# Patient Record
Sex: FEMALE | Race: BLACK OR AFRICAN AMERICAN | NOT HISPANIC OR LATINO | Employment: OTHER | URBAN - METROPOLITAN AREA
[De-identification: names, ages, dates, MRNs, and addresses within clinical notes are randomized per-mention and may not be internally consistent; named-entity substitution may affect disease eponyms.]

---

## 2024-10-21 NOTE — H&P
Subjective:     Patient ID: Roopa Marie is a 59 y.o. female.    Chief Complaint: No chief complaint on file.      HPI:  The patient is a 59-year-old female seen in follow-up with bilateral carpal tunnel syndrome.  She was last injected 2024 with good relief and wishes reinjection today.  She wishes to have a left carpal tunnel release.  She does dialysis  and Friday.  She also has a pacemaker.    Past Medical History:   Diagnosis Date    Anxiety     Depression      Past Surgical History:   Procedure Laterality Date     SECTION      HYSTERECTOMY      INSERTION OF PACEMAKER Bilateral 2021     Family History   Problem Relation Name Age of Onset    Diabetes Mother      Hypertension Mother      Diabetes Father      Hypertension Father      Cancer Maternal Grandmother      Heart disease Neg Hx       Social History     Socioeconomic History    Marital status:    Tobacco Use    Smoking status: Never    Smokeless tobacco: Never   Substance and Sexual Activity    Alcohol use: Never    Drug use: Never    Sexual activity: Not Currently     Social Drivers of Health     Physical Activity: Low Risk  (2023)    Received from [facility], [facility]    PCARE Exercise SDOH     Exercise: Aerobic     Medication List with Changes/Refills   Current Medications    ACCU-CHEK KACI PLUS TEST STRP STRP    USE TO TEST BLOOD SUGAR TID    ACCU-CHEK SOFTCLIX LANCETS MISC    TEST BLOOD SUGAR TID    ALBUTEROL (PROVENTIL/VENTOLIN HFA) 90 MCG/ACTUATION INHALER    INHALE 1 TO 2 PUFFS BY MOUTH FOUR TIMES DAILY    ATORVASTATIN (LIPITOR) 20 MG TABLET    Take 1 tablet (20 mg total) by mouth every evening.    BLOOD-GLUCOSE SENSOR (FREESTYLE ASTER 3 SENSOR) RIANNA    CHANGE SENSOR EVERY 14     DAYS.    CHOLECALCIFEROL, VITAMIN D3, 1,250 MCG (50,000 UNIT) CAPSULE    Take 1 capsule (50,000 Units total) by mouth once a week.    DICLOFENAC SODIUM (VOLTAREN) 1 % GEL    Apply  "topically 2 (two) times daily.    EMPAGLIFLOZIN (JARDIANCE) 10 MG TABLET    Take 1 tablet (10 mg total) by mouth once daily.    FUROSEMIDE (LASIX) 40 MG TABLET    Take 1 tablet (40 mg total) by mouth every morning.    INSULIN LISPRO (HUMALOG KWIKPEN INSULIN) 100 UNIT/ML PEN    Inject 10 Units into the skin 3 (three) times daily.    IVABRADINE (CORLANOR) 5 MG TAB    Take 1 tablet by mouth 2 (two) times daily.    LISDEXAMFETAMINE (VYVANSE) 60 MG CAPSULE    Take 1 capsule (60 mg total) by mouth every morning.    MELOXICAM (MOBIC) 15 MG TABLET    TAKE 1 TABLET(15 MG) BY MOUTH DAILY    METOPROLOL SUCCINATE (TOPROL-XL) 25 MG 24 HR TABLET    Take 1 tablet (25 mg total) by mouth every morning.    MIRTAZAPINE (REMERON) 30 MG TABLET    Take 1 tablet (30 mg total) by mouth every evening.    MUPIROCIN (BACTROBAN) 2 % OINTMENT    Apply topically 3 (three) times daily.    NEBULIZER AND COMPRESSOR RIANNA    Use every 4-6 hrs prn for wheezing    NYSTATIN-TRIAMCINOLONE (MYCOLOG II) CREAM    Apply topically 2 (two) times daily.    ONDANSETRON (ZOFRAN-ODT) 8 MG TBDL    Take 1 tablet (8 mg total) by mouth every 12 (twelve) hours as needed.    PEN NEEDLE, DIABETIC (BD ULTRA-FINE POLO PEN NEEDLE) 32 GAUGE X 5/32" NDLE    USE ONCE DAILY WITH BASAGLAR    PREGABALIN (LYRICA) 100 MG CAPSULE    Take 1 capsule (100 mg total) by mouth 2 (two) times daily.    SACUBITRIL-VALSARTAN (ENTRESTO) 24-26 MG PER TABLET    Take 1 tablet by mouth 2 (two) times daily.    SPIRONOLACTONE (ALDACTONE) 25 MG TABLET    Take 25 mg by mouth every morning.    TIRZEPATIDE (MOUNJARO) 5 MG/0.5 ML PNIJ    Inject 5 mg into the skin every 7 days. Inject   10  mg(2 shots) once a week    TRAMADOL (ULTRAM) 50 MG TABLET    TK 1 T PO  BID     Review of patient's allergies indicates:   Allergen Reactions    Grass pollen-crow grass standard Hives     Review of Systems   Constitutional: Negative for malaise/fatigue.   HENT:  Negative for hearing loss.    Eyes:  Negative for double " vision and visual disturbance.   Cardiovascular:  Negative for chest pain.   Respiratory:  Positive for wheezing. Negative for shortness of breath.    Endocrine: Negative for cold intolerance.   Hematologic/Lymphatic: Does not bruise/bleed easily.   Skin:  Negative for poor wound healing and suspicious lesions.   Musculoskeletal:  Positive for neck pain. Negative for gout, joint pain and joint swelling.   Gastrointestinal:  Negative for nausea and vomiting.   Genitourinary:  Negative for dysuria.   Neurological:  Positive for focal weakness, numbness, paresthesias and sensory change.   Psychiatric/Behavioral:  Positive for altered mental status and depression. Negative for memory loss and substance abuse. The patient is nervous/anxious.    Allergic/Immunologic: Negative for persistent infections.       Objective:   There is no height or weight on file to calculate BMI.  There were no vitals filed for this visit.             General    Constitutional: She is oriented to person, place, and time. She appears well-developed and well-nourished. No distress.   HENT:   Head: Normocephalic. Mouth/Throat: Oropharynx is clear and moist.   Eyes: EOM are normal.   Cardiovascular:  Normal rate.            Pulmonary/Chest: Effort normal.   Abdominal: Soft.   Neurological: She is alert and oriented to person, place, and time. No cranial nerve deficit.   Psychiatric: She has a normal mood and affect.             Right Hand/Wrist Exam     Inspection   Scars: Wrist - absent Hand -  absent  Effusion: Wrist - absent Hand -  absent    Pain   Wrist - The patient exhibits pain of the flexor/pronator group.    Tests   Phalens sign: positive  Tinel's sign (median nerve): positive  Carpal Tunnel Compression Test: positive    Atrophy   Thenar:  negative  Intrinsic:  negative    Other     Neuorologic Exam    Median Distribution: abnormal  Ulnar Distribution: normal  Radial Distribution: normal    Comments:  The patient has a positive Tinel  and positive Phalen sign.  There is no thenar atrophy noted.      Left Hand/Wrist Exam     Inspection   Scars: Wrist - absent Hand -  absent  Effusion: Wrist - absent Hand -  absent    Pain   Wrist - The patient exhibits pain of the flexor/pronator group.    Tests   Phalens sign: positive  Tinel's sign (median nerve): positive  Carpal Tunnel Compression Test: positive    Atrophy  Thenar:  Negative  Intrinsic: negative    Other     Sensory Exam  Median Distribution: abnormal  Ulnar Distribution: normal  Radial Distribution: normal    Comments:  The patient has a positive Tinel and positive Phalen sign.  There is no thenar atrophy noted.          Vascular Exam       Capillary Refill  Right Hand: normal capillary refill  Left Hand: normal capillary refill      radiographs were not obtained today  Assessment:     Encounter Diagnosis   Name Primary?    Bilateral carpal tunnel syndrome Yes        Plan:     The patient wishes to have a left carpal tunnel release.  She was counseled regarding the surgery.  Risk complications and alternatives were discussed including the risk of infection, anesthetic risk, injury to nerves and vessels, loss of motion, and possible need for additional surgeries were discussed.  She seems to understand and agree that surgery.  All questions were answered.  The patient was injected in both carpal tunnels today at her request each with 1 cc Kenalog and 1 cc 2% plain lidocaine under sterile technique.  She has had nerve studies that did confirm bilateral carpal tunnel syndrome.                Disclaimer: This note was prepared using a voice recognition system and is likely to have sound alike errors within the text.

## 2024-11-04 ENCOUNTER — TELEPHONE (OUTPATIENT)
Dept: PREADMISSION TESTING | Facility: HOSPITAL | Age: 59
End: 2024-11-04
Payer: MEDICARE

## 2024-11-04 RX ORDER — POLYETHYLENE GLYCOL 3350, SODIUM SULFATE ANHYDROUS, SODIUM BICARBONATE, SODIUM CHLORIDE, POTASSIUM CHLORIDE 236; 22.74; 6.74; 5.86; 2.97 G/4L; G/4L; G/4L; G/4L; G/4L
4 POWDER, FOR SOLUTION ORAL ONCE
Qty: 4000 ML | Refills: 0 | Status: SHIPPED | OUTPATIENT
Start: 2024-11-04 | End: 2024-11-04

## 2024-11-04 NOTE — TELEPHONE ENCOUNTER
----- Message from Mendoza Mercer MD sent at 11/4/2024 11:08 AM CST -----  Regarding: RE: colonoscopy referral for new pt  Hey there - I will order when I see patient. I've learned hard way not to order until I have established relationship. Thanks.  ----- Message -----  From: Lisa Corral RN  Sent: 11/1/2024  12:43 PM CST  To: Mendoza Mercer MD  Subject: colonoscopy referral for new pt                  Dr. Mercer,  Patient is switching to you from Dr. Clemens d/t insurance. She is supposed to get a screening colonoscopy. We got her cc and it expires 1/29 so she wanted to go ahead and get scheduled. She wants you to write the colonoscopy referral so that it is all in your name for followup and results etc. Her first appt with you is 11/20 and she wanted to go ahead and schedule her colonoscopy for 12/17. I will place a hold on the schedule until we receive the new referral from you. If you are ok placing a new ambulatory referral to endo procedure  for the colonoscopy for me that would be great. If not, would you please do so at her 11/20 appt?  Feel free to reach out with any questions.  Thank you,   Lisa Corral RN  Endoscopy Scheduling/Pre-admit Department

## 2024-11-06 ENCOUNTER — TELEPHONE (OUTPATIENT)
Dept: ORTHOPEDICS | Facility: CLINIC | Age: 59
End: 2024-11-06
Payer: MEDICARE

## 2024-11-06 ENCOUNTER — TELEPHONE (OUTPATIENT)
Dept: PREADMISSION TESTING | Facility: HOSPITAL | Age: 59
End: 2024-11-06
Payer: MEDICARE

## 2024-11-06 NOTE — TELEPHONE ENCOUNTER
----- Message from Alina sent at 11/6/2024 10:53 AM CST -----  Contact: Roopa Lorenzoy is calling because she has an upcoming surgery and no information from anyone. Please call her at [phone].    Thanks  Sl

## 2024-11-06 NOTE — TELEPHONE ENCOUNTER
Called and spoke with patient - informed patient that our preadmit will reach out to her to give the arrival time for surgery    Verbalized understanding and thankful for call

## 2024-11-06 NOTE — TELEPHONE ENCOUNTER
Called and lvm about the following:     Please arrive to [facility] at 7:00 am on 11/07/2024 for your scheduled procedure.  Address: [address] [place] [address] [place] [id number] (2nd Building on left, 1st Floor Lobby)    !!!NO FOOD after midnight! You may have clear liquids up to 3 hrs before your arrival to the Hospital!!!  Clear liquids include Gatorade, water, soda, black coffee or tea (no milk or creamer), and clear juices.  Clear liquids do NOT include anything with pulp or food particles (Chicken broth, ice cream, yogurt, Jello, etc.)

## 2024-11-06 NOTE — TELEPHONE ENCOUNTER
Called and spoke with Pt about the following:     Please arrive to [facility] ([facility] at 7:00 am on 11/07/2024 for your scheduled procedure.  Address: [address] [place] [address] [place] [id number] (2nd Building on left, 1st Floor Lobby)    !!!NO FOOD after midnight! You may have clear liquids up to 3 hrs before your arrival to the Hospital!!!  Clear liquids include Gatorade, water, soda, black coffee or tea (no milk or creamer), and clear juices.  Clear liquids do NOT include anything with pulp or food particles (Chicken broth, ice cream, yogurt, Jello, etc.)

## 2024-11-07 ENCOUNTER — ANESTHESIA EVENT (OUTPATIENT)
Dept: SURGERY | Facility: HOSPITAL | Age: 59
End: 2024-11-07
Payer: MEDICARE

## 2024-11-07 ENCOUNTER — TELEPHONE (OUTPATIENT)
Dept: ORTHOPEDICS | Facility: CLINIC | Age: 59
End: 2024-11-07
Payer: MEDICARE

## 2024-11-07 ENCOUNTER — HOSPITAL ENCOUNTER (OUTPATIENT)
Facility: HOSPITAL | Age: 59
Discharge: HOME | End: 2024-11-07
Attending: ORTHOPAEDIC SURGERY | Admitting: ORTHOPAEDIC SURGERY
Payer: MEDICARE

## 2024-11-07 ENCOUNTER — ANESTHESIA (OUTPATIENT)
Dept: SURGERY | Facility: HOSPITAL | Age: 59
End: 2024-11-07
Payer: MEDICARE

## 2024-11-07 VITALS
DIASTOLIC BLOOD PRESSURE: 88 MMHG | TEMPERATURE: 98 F | BODY MASS INDEX: 20.06 KG/M2 | WEIGHT: 117.5 LBS | HEIGHT: 64 IN | OXYGEN SATURATION: 99 % | HEART RATE: 73 BPM | RESPIRATION RATE: 24 BRPM | SYSTOLIC BLOOD PRESSURE: 139 MMHG

## 2024-11-07 DIAGNOSIS — G56.02 CARPAL TUNNEL SYNDROME OF LEFT WRIST: Primary | ICD-10-CM

## 2024-11-07 DIAGNOSIS — G56.00 CARPAL TUNNEL SYNDROME: ICD-10-CM

## 2024-11-07 PROCEDURE — 36000707: Performed by: ORTHOPAEDIC SURGERY

## 2024-11-07 PROCEDURE — 37000009 HC ANESTHESIA EA ADD 15 MINS: Performed by: ORTHOPAEDIC SURGERY

## 2024-11-07 PROCEDURE — 71000015 HC POSTOP RECOV 1ST HR: Performed by: ORTHOPAEDIC SURGERY

## 2024-11-07 PROCEDURE — 71000033 HC RECOVERY, INTIAL HOUR: Performed by: ORTHOPAEDIC SURGERY

## 2024-11-07 PROCEDURE — 37000008 HC ANESTHESIA 1ST 15 MINUTES: Performed by: ORTHOPAEDIC SURGERY

## 2024-11-07 PROCEDURE — 63600175 PHARM REV CODE 636 W HCPCS: Performed by: ORTHOPAEDIC SURGERY

## 2024-11-07 PROCEDURE — 64721 CARPAL TUNNEL SURGERY: CPT | Mod: LT,,, | Performed by: ORTHOPAEDIC SURGERY

## 2024-11-07 PROCEDURE — 36000706: Performed by: ORTHOPAEDIC SURGERY

## 2024-11-07 PROCEDURE — 25000003 PHARM REV CODE 250: Performed by: ANESTHESIOLOGY

## 2024-11-07 PROCEDURE — 63600175 PHARM REV CODE 636 W HCPCS: Performed by: STUDENT IN AN ORGANIZED HEALTH CARE EDUCATION/TRAINING PROGRAM

## 2024-11-07 PROCEDURE — 25000003 PHARM REV CODE 250: Performed by: STUDENT IN AN ORGANIZED HEALTH CARE EDUCATION/TRAINING PROGRAM

## 2024-11-07 RX ORDER — SODIUM CHLORIDE 0.9 % (FLUSH) 0.9 %
10 SYRINGE (ML) INJECTION
Status: DISCONTINUED | OUTPATIENT
Start: 2024-11-07 | End: 2024-11-07 | Stop reason: HOSPADM

## 2024-11-07 RX ORDER — DEXAMETHASONE SODIUM PHOSPHATE 4 MG/ML
INJECTION, SOLUTION INTRA-ARTICULAR; INTRALESIONAL; INTRAMUSCULAR; INTRAVENOUS; SOFT TISSUE
Status: DISCONTINUED | OUTPATIENT
Start: 2024-11-07 | End: 2024-11-07

## 2024-11-07 RX ORDER — OXYCODONE AND ACETAMINOPHEN 5; 325 MG/1; MG/1
1 TABLET ORAL
Status: DISCONTINUED | OUTPATIENT
Start: 2024-11-07 | End: 2024-11-07 | Stop reason: HOSPADM

## 2024-11-07 RX ORDER — LIDOCAINE HYDROCHLORIDE 20 MG/ML
INJECTION, SOLUTION EPIDURAL; INFILTRATION; INTRACAUDAL; PERINEURAL
Status: DISCONTINUED | OUTPATIENT
Start: 2024-11-07 | End: 2024-11-07

## 2024-11-07 RX ORDER — HYDROCODONE BITARTRATE AND ACETAMINOPHEN 5; 325 MG/1; MG/1
1 TABLET ORAL EVERY 4 HOURS PRN
Status: DISCONTINUED | OUTPATIENT
Start: 2024-11-07 | End: 2024-11-07 | Stop reason: HOSPADM

## 2024-11-07 RX ORDER — CEFAZOLIN 2 G/1
2 INJECTION, POWDER, FOR SOLUTION INTRAMUSCULAR; INTRAVENOUS
Status: COMPLETED | OUTPATIENT
Start: 2024-11-07 | End: 2024-11-07

## 2024-11-07 RX ORDER — CHLORHEXIDINE GLUCONATE ORAL RINSE 1.2 MG/ML
10 SOLUTION DENTAL
Status: DISCONTINUED | OUTPATIENT
Start: 2024-11-07 | End: 2024-11-07 | Stop reason: HOSPADM

## 2024-11-07 RX ORDER — CHLORHEXIDINE GLUCONATE ORAL RINSE 1.2 MG/ML
10 SOLUTION DENTAL 2 TIMES DAILY
Status: DISCONTINUED | OUTPATIENT
Start: 2024-11-07 | End: 2024-11-07 | Stop reason: HOSPADM

## 2024-11-07 RX ORDER — HYDROCODONE BITARTRATE AND ACETAMINOPHEN 5; 325 MG/1; MG/1
1 TABLET ORAL EVERY 6 HOURS PRN
Qty: 15 TABLET | Refills: 0 | Status: SHIPPED | OUTPATIENT
Start: 2024-11-07

## 2024-11-07 RX ORDER — MIDAZOLAM HYDROCHLORIDE 1 MG/ML
INJECTION INTRAMUSCULAR; INTRAVENOUS
Status: DISCONTINUED | OUTPATIENT
Start: 2024-11-07 | End: 2024-11-07

## 2024-11-07 RX ORDER — ONDANSETRON HYDROCHLORIDE 2 MG/ML
4 INJECTION, SOLUTION INTRAVENOUS ONCE AS NEEDED
Status: DISCONTINUED | OUTPATIENT
Start: 2024-11-07 | End: 2024-11-07 | Stop reason: HOSPADM

## 2024-11-07 RX ORDER — MEPERIDINE HYDROCHLORIDE 25 MG/ML
12.5 INJECTION INTRAMUSCULAR; INTRAVENOUS; SUBCUTANEOUS ONCE AS NEEDED
Status: DISCONTINUED | OUTPATIENT
Start: 2024-11-07 | End: 2024-11-07 | Stop reason: HOSPADM

## 2024-11-07 RX ORDER — PROPOFOL 10 MG/ML
VIAL (ML) INTRAVENOUS CONTINUOUS PRN
Status: DISCONTINUED | OUTPATIENT
Start: 2024-11-07 | End: 2024-11-07

## 2024-11-07 RX ORDER — ONDANSETRON HYDROCHLORIDE 2 MG/ML
4 INJECTION, SOLUTION INTRAVENOUS DAILY PRN
Status: DISCONTINUED | OUTPATIENT
Start: 2024-11-07 | End: 2024-11-07 | Stop reason: HOSPADM

## 2024-11-07 RX ORDER — ONDANSETRON HYDROCHLORIDE 2 MG/ML
INJECTION, SOLUTION INTRAVENOUS
Status: DISCONTINUED | OUTPATIENT
Start: 2024-11-07 | End: 2024-11-07

## 2024-11-07 RX ORDER — FENTANYL CITRATE 50 UG/ML
INJECTION, SOLUTION INTRAMUSCULAR; INTRAVENOUS
Status: DISCONTINUED | OUTPATIENT
Start: 2024-11-07 | End: 2024-11-07

## 2024-11-07 RX ORDER — LIDOCAINE HYDROCHLORIDE 20 MG/ML
INJECTION, SOLUTION EPIDURAL; INFILTRATION; INTRACAUDAL; PERINEURAL
Status: DISCONTINUED | OUTPATIENT
Start: 2024-11-07 | End: 2024-11-07 | Stop reason: HOSPADM

## 2024-11-07 RX ORDER — FENTANYL CITRATE 50 UG/ML
25 INJECTION, SOLUTION INTRAMUSCULAR; INTRAVENOUS EVERY 5 MIN PRN
Status: DISCONTINUED | OUTPATIENT
Start: 2024-11-07 | End: 2024-11-07 | Stop reason: HOSPADM

## 2024-11-07 RX ORDER — HYDROMORPHONE HYDROCHLORIDE 1 MG/ML
0.2 INJECTION, SOLUTION INTRAMUSCULAR; INTRAVENOUS; SUBCUTANEOUS EVERY 5 MIN PRN
Status: DISCONTINUED | OUTPATIENT
Start: 2024-11-07 | End: 2024-11-07 | Stop reason: HOSPADM

## 2024-11-07 RX ADMIN — PROPOFOL 50 MCG/KG/MIN: 10 INJECTION, EMULSION INTRAVENOUS at 08:11

## 2024-11-07 RX ADMIN — CEFAZOLIN 2 G: 2 INJECTION, POWDER, FOR SOLUTION INTRAMUSCULAR; INTRAVENOUS at 08:11

## 2024-11-07 RX ADMIN — OXYCODONE HYDROCHLORIDE AND ACETAMINOPHEN 1 TABLET: 5; 325 TABLET ORAL at 08:11

## 2024-11-07 RX ADMIN — MIDAZOLAM HYDROCHLORIDE 2 MG: 1 INJECTION, SOLUTION INTRAMUSCULAR; INTRAVENOUS at 08:11

## 2024-11-07 RX ADMIN — DEXAMETHASONE SODIUM PHOSPHATE 8 MG: 4 INJECTION, SOLUTION INTRA-ARTICULAR; INTRALESIONAL; INTRAMUSCULAR; INTRAVENOUS; SOFT TISSUE at 08:11

## 2024-11-07 RX ADMIN — SODIUM CHLORIDE: 9 INJECTION, SOLUTION INTRAVENOUS at 08:11

## 2024-11-07 RX ADMIN — ONDANSETRON 4 MG: 2 INJECTION INTRAMUSCULAR; INTRAVENOUS at 08:11

## 2024-11-07 RX ADMIN — FENTANYL CITRATE 50 MCG: 50 INJECTION, SOLUTION INTRAMUSCULAR; INTRAVENOUS at 08:11

## 2024-11-07 RX ADMIN — LIDOCAINE HYDROCHLORIDE 100 MG: 20 INJECTION, SOLUTION EPIDURAL; INFILTRATION; INTRACAUDAL; PERINEURAL at 08:11

## 2024-11-07 NOTE — OP NOTE
Critical access hospital - Surgery ([facility])  Orthopedic Surgery  Operative Note    SUMMARY     Date of Procedure: 11/7/2024   Assistant: None    Procedure: Procedure(s) (LRB):  RELEASE, CARPAL TUNNEL (Left)       Surgeons and Role:     * Renato Shoemaker MD - Primary    Assisting Surgeon: None    Pre-Operative Diagnosis:  Left carpal tunnel syndrome    Post-Operative Diagnosis:  Left carpal tunnel syndrome    Anesthesia:  Local with sedation    Technical Procedures Used:  left carpal tunnel release    Description of the Findings of the Procedure:  The patient was taken to the operating room where 10 cc of 2% plain lidocaine was used for local anesthetic and was administered.  After exsanguination of the extremity a proximal tourniquet was inflated to 250 mm of mercury.  Satisfactory anesthesia had been achieved the left hand was prepped and draped in the usual sterile fashion.  The patient did receive 2 g of Ancef intravenously preoperatively.  At this time a longitudinal incision was made in line with the 4th ray over the transverse carpal ligament.  The incision was extended using blunt and sharp dissection under 3.5 loupe magnification.  The transverse carpal ligament was identified and sharply incised under direct vision.  A complete release was performed and verified by the surgeon's small finger both proximally and distally.  No additional pathology was encountered.  Satisfactory release had been achieved skin was closed using horizontal mattress 4 0 nylon suture.  Xeroform gauze 4x4s and 2 ABD pads were over wrapped with 3 in gauze dressing.  The patient tolerated the procedure well and was transferred to the recovery room in satisfactory condition.      Complications: No    Estimated Blood Loss (EBL): 5cc                        Condition: Good    Disposition: PACU - hemodynamically stable.    Attestation: I was present and scrubbed for the entire procedure.

## 2024-11-07 NOTE — TRANSFER OF CARE
"Anesthesia Transfer of Care Note    Patient: Roopa Hicks    Procedure(s) Performed: Procedure(s) (LRB):  RELEASE, CARPAL TUNNEL (Left)    Patient location: PACU    Anesthesia Type: MAC    Transport from OR: Transported from OR on room air with adequate spontaneous ventilation    Post pain: adequate analgesia    Post assessment: no apparent anesthetic complications    Post vital signs: stable    Level of consciousness: responds to stimulation and sedated    Nausea/Vomiting: no nausea/vomiting    Complications: none    Transfer of care protocol was followedComments: Report given to PACU RN at bedside. Hand off tool used. RN given opportunity to ask questions or clarify concerns. No Concerns verbalized. RN was asked if ready to assume care of patient. RN verbally confirmed. Pt. left in stable condition. SV. Vital Signs Return to Near Baseline. No s/s of distress noted.       Last vitals: Visit Vitals  BP (!) 153/92 (BP Location: Right arm, Patient Position: Sitting)   Pulse 75   Temp 36.7 °C (98.1 °F) (Temporal)   Resp 20   Ht 5' 4" (1.626 m)   Wt 53.3 kg (117 lb 8.1 oz)   SpO2 99%   Breastfeeding No   BMI 20.17 kg/m²     "

## 2024-11-07 NOTE — DISCHARGE SUMMARY
[facility] - Surgery (Hospital)  Discharge Note  Short Stay    Procedure(s) (LRB):  RELEASE, CARPAL TUNNEL (Left)      OUTCOME: Patient tolerated treatment/procedure well without complication and is now ready for discharge.    DISPOSITION: Home or Self Care    FINAL DIAGNOSIS:  Left carpal tunnel syndrome    FOLLOWUP: In clinic    DISCHARGE INSTRUCTIONS:    Discharge Procedure Orders   Diet general     Call MD for:  temperature >100.4     Call MD for:  persistent nausea and vomiting     Call MD for:  severe uncontrolled pain     Call MD for:  difficulty breathing, headache or visual disturbances     Call MD for:  redness, tenderness, or signs of infection (pain, swelling, redness, odor or green/yellow discharge around incision site)     Call MD for:  hives     Call MD for:  persistent dizziness or light-headedness     Call MD for:  extreme fatigue        TIME SPENT ON DISCHARGE:  20 minutes

## 2024-11-07 NOTE — ANESTHESIA PREPROCEDURE EVALUATION
2024  Roopa Hicks is a 59 y.o., female    Patient Active Problem List   Diagnosis    Type 2 diabetes mellitus with diabetic polyneuropathy, with long-term current use of insulin    Diabetic peripheral neuropathy associated with type 2 diabetes mellitus    Essential hypertension, benign    Major depression, single episode    Inattention    Anxiety about health    Gastroparesis due to DM    Renal impairment    Hypoglycemia    Chronic systolic CHF (congestive heart failure)    Hypertension associated with type 2 diabetes mellitus    Primary dilated cardiomyopathy    Mild intermittent asthma without complication    Major depressive disorder, recurrent severe without psychotic features    Osteopenia of multiple sites    Bilateral hand pain    Decreased range of motion of right wrist    Decreased  strength of right hand    Paresthesia of hand, bilateral    Decreased range of motion of right shoulder    Shoulder weakness    Decreased ROM of intervertebral discs of cervical spine    Arthritis    Carpal tunnel syndrome, bilateral     Past Medical History:   Diagnosis Date    Anxiety     Arthritis     Carpal tunnel syndrome, bilateral     Depression     Diabetic peripheral neuropathy associated with type 2 diabetes mellitus     Essential (primary) hypertension     Gastroparesis due to secondary diabetes     Mild intermittent asthma, uncomplicated     Osteopenia     Renal impairment     Shoulder pain, right     Type 2 diabetes mellitus without complications      Past Surgical History:   Procedure Laterality Date     SECTION      HYSTERECTOMY      INSERTION OF PACEMAKER Bilateral 2021    NEPHRECTOMY Left          Chemistry        Component Value Date/Time     10/23/2024 1209    K 4.3 10/23/2024 1209     10/23/2024 1209    CO2 24 10/23/2024 1209    BUN 24 (H) 10/23/2024 1209     CREATININE 0.9 10/23/2024 1209     (H) 10/23/2024 1209        Component Value Date/Time    CALCIUM 9.7 10/23/2024 1209    ALKPHOS 92 08/17/2024 1313    AST 18 08/17/2024 1313    ALT 15 08/17/2024 1313    BILITOT 1.3 (H) 08/17/2024 1313    ESTGFRAFRICA 79 09/02/2021 0551    EGFRNONAA 62 09/17/2021 1219        Lab Results   Component Value Date    WBC 7.22 10/23/2024    HGB 13.5 10/23/2024    HCT 41.9 10/23/2024    MCV 90 10/23/2024     10/23/2024           Pre-op Assessment    I have reviewed the Patient Summary Reports.    I have reviewed the NPO Status.   I have reviewed the Medications.     Review of Systems  Anesthesia Hx:  No problems with previous Anesthesia   History of prior surgery of interest to airway management or planning:  Previous anesthesia: General           Social:  Non-Smoker       Hematology/Oncology:  Hematology Normal   Oncology Normal                                   Cardiovascular:     Hypertension       CHF       ECG has been reviewed. Normal sinus rhythm   Left atrial enlargement   Low voltage QRS   ST and T wave abnormality, consider lateral ischemia   Abnormal ECG   When compared with ECG of 17-AUG-2024 14:28,   No significant change was found   Confirmed by MANNY GOMEZ, HOMEYAR ([id number]) on 8/17/2024 9:28:13 PM                              Pulmonary:    Asthma mild                   Renal/:  Chronic Renal Disease   Single kidney (S/p nephrectomy)             Hepatic/GI:        Gastroparesis    Takes Mounjaro?             Musculoskeletal:  Musculoskeletal Normal                Neurological:  Neurology Normal                                      Endocrine:  Diabetes, type 2           Psych:    depression                Physical Exam  General: Well nourished, Cooperative, Alert and Oriented    Airway:  Mallampati: II / II  Mouth Opening: Normal  TM Distance: Normal  Tongue: Normal  Neck ROM: Normal ROM    Dental:  Intact      Patient Active Problem List   Diagnosis    Type 2  diabetes mellitus with diabetic polyneuropathy, with long-term current use of insulin    Diabetic peripheral neuropathy associated with type 2 diabetes mellitus    Essential hypertension, benign    Major depression, single episode    Inattention    Anxiety about health    Gastroparesis due to DM    Renal impairment    Hypoglycemia    Chronic systolic CHF (congestive heart failure)    Hypertension associated with type 2 diabetes mellitus    Primary dilated cardiomyopathy    Mild intermittent asthma without complication    Major depressive disorder, recurrent severe without psychotic features    Osteopenia of multiple sites    Bilateral hand pain    Decreased range of motion of right wrist    Decreased  strength of right hand    Paresthesia of hand, bilateral    Decreased range of motion of right shoulder    Shoulder weakness    Decreased ROM of intervertebral discs of cervical spine    Arthritis    Carpal tunnel syndrome, bilateral     \      Anesthesia Plan  Type of Anesthesia, risks & benefits discussed:    Anesthesia Type: Gen ETT, MAC, Gen Supraglottic Airway  Intra-op Monitoring Plan: Standard ASA Monitors  Post Op Pain Control Plan: multimodal analgesia and IV/PO Opioids PRN  Induction:  IV  Airway Plan: Direct, Post-Induction  Informed Consent: Informed consent signed with the Patient and all parties understand the risks and agree with anesthesia plan.  All questions answered.   ASA Score: 3  Day of Surgery Review of History & Physical: H&P Update referred to the surgeon/provider.I have interviewed and examined the patient. I have reviewed the patient's H&P dated: There are no significant changes. H&P completed by Anesthesiologist.    Ready For Surgery From Anesthesia Perspective.     .

## 2024-11-07 NOTE — ANESTHESIA POSTPROCEDURE EVALUATION
Anesthesia Post Evaluation    Patient: Roopa Hicks    Procedure(s) Performed: Procedure(s) (LRB):  RELEASE, CARPAL TUNNEL (Left)    [facility] Anesthesia Post Op Evaluation      Vitals Value Taken Time   BP 90/52 11/07/24 0825   Temp 37 11/07/24 0827   Pulse 66 11/07/24 0827   Resp 7 11/07/24 0827   SpO2 98 % 11/07/24 0827   Vitals shown include unfiled device data.      No case tracking events are documented in the log.      Pain/Martha Score: No data recorded

## 2024-11-07 NOTE — TELEPHONE ENCOUNTER
Spoke to the patient an she is going to come tomorrow to  an sign for the sling that she prefers than the one that she received.       ----- Message from Janelle sent at 11/7/2024  2:40 PM CST -----  Contact: Roopa Blas would like a call back at [phone] in regards to the sling that she was given to wear. Patient states she can't wear that one due to her pacemaker and is needing something different.  Thanks   Am

## 2024-11-08 ENCOUNTER — TELEPHONE (OUTPATIENT)
Dept: ORTHOPEDICS | Facility: CLINIC | Age: 59
End: 2024-11-08
Payer: MEDICARE

## 2024-11-08 LAB — POCT GLUCOSE: 124 MG/DL (ref 70–110)

## 2024-11-08 NOTE — TELEPHONE ENCOUNTER
Spoke to the patient an let her know what the name of the injection that Dr. Shoemaker gave her on 9/27/2024. I gave her the name the patient verbalized understanding.           ----- Message from Lizette sent at 11/8/2024  3:27 PM CST -----  Contact: Roopa  Mrs. Blas needs a call back at [phone] in regards to knowing the name of the injections for her carpal tunnel she had on her left wrist.    Thanks  MW

## 2024-11-10 NOTE — ANESTHESIA POSTPROCEDURE EVALUATION
Anesthesia Post Evaluation    Patient: Roopa Hicks    Procedure(s) Performed: Procedure(s) (LRB):  RELEASE, CARPAL TUNNEL (Left)    Final Anesthesia Type: MAC      Patient location during evaluation: PACU  Patient participation: Yes- Able to Participate  Level of consciousness: awake and alert and oriented  Post-procedure vital signs: reviewed and stable  Pain management: adequate  Airway patency: patent  SALLY mitigation strategies: Multimodal analgesia  PONV status at discharge: No PONV  Anesthetic complications: no      Cardiovascular status: blood pressure returned to baseline and hemodynamically stable  Respiratory status: unassisted  Hydration status: euvolemic  Follow-up not needed.              Vitals Value Taken Time   /88 11/07/24 0900   Temp 36.4 °C (97.6 °F) 11/07/24 0857   Pulse 73 11/07/24 0900   Resp 20 11/07/24 0900   SpO2 99 % 11/07/24 0900         Event Time   Out of Recovery 09:03:23         Pain/Martha Score: No data recorded

## 2024-11-12 ENCOUNTER — OFFICE VISIT (OUTPATIENT)
Dept: ORTHOPEDICS | Facility: CLINIC | Age: 59
End: 2024-11-12
Payer: MEDICARE

## 2024-11-12 VITALS — WEIGHT: 117.5 LBS | HEIGHT: 64 IN | BODY MASS INDEX: 20.06 KG/M2

## 2024-11-12 DIAGNOSIS — G56.03 BILATERAL CARPAL TUNNEL SYNDROME: Primary | ICD-10-CM

## 2024-11-12 PROCEDURE — 99999 PR PBB SHADOW E&M-EST. PATIENT-LVL IV: CPT | Mod: PBBFAC,,, | Performed by: ORTHOPAEDIC SURGERY

## 2024-11-12 PROCEDURE — 3044F HG A1C LEVEL LT 7.0%: CPT | Mod: CPTII,S$GLB,, | Performed by: ORTHOPAEDIC SURGERY

## 2024-11-12 PROCEDURE — 99024 POSTOP FOLLOW-UP VISIT: CPT | Mod: S$GLB,,, | Performed by: ORTHOPAEDIC SURGERY

## 2024-11-12 PROCEDURE — 3060F POS MICROALBUMINURIA REV: CPT | Mod: CPTII,S$GLB,, | Performed by: ORTHOPAEDIC SURGERY

## 2024-11-12 PROCEDURE — 3066F NEPHROPATHY DOC TX: CPT | Mod: CPTII,S$GLB,, | Performed by: ORTHOPAEDIC SURGERY

## 2024-11-12 PROCEDURE — 4010F ACE/ARB THERAPY RXD/TAKEN: CPT | Mod: CPTII,S$GLB,, | Performed by: ORTHOPAEDIC SURGERY

## 2024-11-12 PROCEDURE — 1160F RVW MEDS BY RX/DR IN RCRD: CPT | Mod: CPTII,S$GLB,, | Performed by: ORTHOPAEDIC SURGERY

## 2024-11-12 PROCEDURE — 1159F MED LIST DOCD IN RCRD: CPT | Mod: CPTII,S$GLB,, | Performed by: ORTHOPAEDIC SURGERY

## 2024-11-12 NOTE — PROGRESS NOTES
Subjective:     Patient ID: Roopa Hicks is a 59 y.o. female.    Chief Complaint: Pain and Post-op Evaluation of the Left Hand      HPI:  The patient is a 59-year-old female status post left carpal tunnel release date of surgery was 2024.  She seems to be doing well.    Past Medical History:   Diagnosis Date    Anxiety     Arthritis     Carpal tunnel syndrome, bilateral     Depression     Diabetic peripheral neuropathy associated with type 2 diabetes mellitus     Essential (primary) hypertension     Gastroparesis due to secondary diabetes     Mild intermittent asthma, uncomplicated     Osteopenia     Renal impairment     Shoulder pain, right     Type 2 diabetes mellitus without complications      Past Surgical History:   Procedure Laterality Date    CARPAL TUNNEL RELEASE Left 2024    Procedure: RELEASE, CARPAL TUNNEL;  Surgeon: Renato Shoemaker MD;  Location: [facility];  Service: Orthopedics;  Laterality: Left;  left carpal tunnel release     SECTION      HYSTERECTOMY      INSERTION OF PACEMAKER Bilateral 2021    NEPHRECTOMY Left      Family History   Problem Relation Name Age of Onset    Diabetes Mother      Hypertension Mother      Heart disease Mother      Diabetes Father      Hypertension Father      Heart disease Father      Cancer Maternal Grandmother       Social History     Socioeconomic History    Marital status:    Tobacco Use    Smoking status: Never    Smokeless tobacco: Never   Substance and Sexual Activity    Alcohol use: Never    Drug use: Never    Sexual activity: Not Currently     Social Drivers of Health     Financial Resource Strain: Medium Risk (2024)    Overall Financial Resource Strain (CARDIA)     Difficulty of Paying Living Expenses: Somewhat hard   Food Insecurity: Food Insecurity Present (2024)    Hunger Vital Sign     Worried About Running Out of Food in the Last Year: Sometimes true     Ran Out of Food in the Last Year: Sometimes true    Physical Activity: Unknown (11/11/2024)    Exercise Vital Sign     Days of Exercise per Week: 3 days   Stress: Stress Concern Present (11/11/2024)    [employer] - Occupational Stress Questionnaire     Feeling of Stress : To some extent   Housing Stability: High Risk (11/11/2024)    Housing Stability Vital Sign     Unable to Pay for Housing in the Last Year: Yes     Medication List with Changes/Refills   Current Medications    ACCU-CHEK KACI PLUS TEST STRP STRP    USE TO TEST BLOOD SUGAR TID    ACCU-CHEK SOFTCLIX LANCETS MISC    TEST BLOOD SUGAR TID    ALBUTEROL (PROVENTIL/VENTOLIN HFA) 90 MCG/ACTUATION INHALER    INHALE 1 TO 2 PUFFS BY MOUTH FOUR TIMES DAILY    ATORVASTATIN (LIPITOR) 20 MG TABLET    Take 1 tablet (20 mg total) by mouth every evening.    BLOOD-GLUCOSE SENSOR (TheatroSTYLE ASTER 3 SENSOR) RIANNA    CHANGE SENSOR EVERY 14     DAYS.    CHOLECALCIFEROL, VITAMIN D3, 1,250 MCG (50,000 UNIT) CAPSULE    Take 1 capsule (50,000 Units total) by mouth once a week.    DICLOFENAC SODIUM (VOLTAREN) 1 % GEL    Apply topically 2 (two) times daily.    EMPAGLIFLOZIN (JARDIANCE) 10 MG TABLET    Take 1 tablet (10 mg total) by mouth once daily.    FUROSEMIDE (LASIX) 40 MG TABLET    Take 1 tablet (40 mg total) by mouth every morning.    HYDROCODONE-ACETAMINOPHEN (NORCO) 5-325 MG PER TABLET    Take 1 tablet by mouth every 6 (six) hours as needed for Pain.    INSULIN LISPRO (HUMALOG KWIKPEN INSULIN) 100 UNIT/ML PEN    Inject 10 Units into the skin 3 (three) times daily.    IVABRADINE (CORLANOR) 5 MG TAB    Take 1 tablet by mouth 2 (two) times daily.    LISDEXAMFETAMINE (VYVANSE) 60 MG CAPSULE    Take 1 capsule (60 mg total) by mouth every morning.    MELOXICAM (MOBIC) 15 MG TABLET    TAKE 1 TABLET(15 MG) BY MOUTH DAILY    METOPROLOL SUCCINATE (TOPROL-XL) 25 MG 24 HR TABLET    Take 1 tablet (25 mg total) by mouth every morning.    MIRTAZAPINE (REMERON) 30 MG TABLET    Take 1 tablet (30 mg total) by  "mouth every evening.    NEBULIZER AND COMPRESSOR RIANNA    Use every 4-6 hrs prn for wheezing    NYSTATIN-TRIAMCINOLONE (MYCOLOG II) CREAM    Apply topically 2 (two) times daily.    ONDANSETRON (ZOFRAN-ODT) 8 MG TBDL    Take 1 tablet (8 mg total) by mouth every 12 (twelve) hours as needed.    PEN NEEDLE, DIABETIC (BD ULTRA-FINE POLO PEN NEEDLE) 32 GAUGE X 5/32" NDLE    USE ONCE DAILY WITH BASAGLAR    PREGABALIN (LYRICA) 100 MG CAPSULE    Take 1 capsule (100 mg total) by mouth 2 (two) times daily.    SACUBITRIL-VALSARTAN (ENTRESTO) 24-26 MG PER TABLET    Take 1 tablet by mouth 2 (two) times daily.    SITAGLIPTIN PHOSPHATE (JANUVIA) 25 MG TAB    Take 25 mg by mouth once daily.    SPIRONOLACTONE (ALDACTONE) 25 MG TABLET    Take 25 mg by mouth every morning.    TIRZEPATIDE (MOUNJARO) 5 MG/0.5 ML PNIJ    Inject 5 mg into the skin every 7 days. Inject   10  mg(2 shots) once a week    TRAMADOL (ULTRAM) 50 MG TABLET    TK 1 T PO  BID     Review of patient's allergies indicates:   Allergen Reactions    Grass pollen-june grass standard Hives     Review of Systems   Constitutional: Negative for malaise/fatigue.   HENT:  Negative for hearing loss.    Eyes:  Negative for double vision and visual disturbance.   Cardiovascular:  Negative for chest pain.   Respiratory:  Positive for wheezing. Negative for shortness of breath.    Endocrine: Negative for cold intolerance.   Hematologic/Lymphatic: Does not bruise/bleed easily.   Skin:  Negative for poor wound healing and suspicious lesions.   Musculoskeletal:  Positive for arthritis, joint pain, joint swelling and stiffness. Negative for gout.   Gastrointestinal:  Positive for constipation and diarrhea. Negative for nausea and vomiting.   Genitourinary:  Negative for dysuria.   Neurological:  Positive for focal weakness, numbness, paresthesias and sensory change.   Psychiatric/Behavioral:  Positive for depression. Negative for memory loss and substance abuse. The patient is " nervous/anxious.    Allergic/Immunologic: Negative for persistent infections.       Objective:   Body mass index is 20.17 kg/m².  There were no vitals filed for this visit.             General    Constitutional: She is oriented to person, place, and time. She appears well-developed and well-nourished. No distress.   HENT:   Head: Normocephalic.   Eyes: EOM are normal.   Pulmonary/Chest: Effort normal.   Neurological: She is oriented to person, place, and time.   Psychiatric: She has a normal mood and affect.         Left Hand/Wrist Exam     Inspection   Scars: Wrist - present Hand -  present  Effusion: Wrist - absent Hand -  absent    Other     Sensory Exam  Median Distribution: normal  Ulnar Distribution: normal  Radial Distribution: normal    Comments:  The suture line is intact left carpal tunnel incision.  There is no sign of infection.  There are no motor or sensory deficits.          Vascular Exam       Capillary Refill  Left Hand: normal capillary refill       radiographs were not obtained today  Assessment:     Encounter Diagnosis   Name Primary?    Bilateral carpal tunnel syndrome Yes        Plan:       The patient had a Band-Aid applied.  Wound care was discussed.  She was given a wrist splint and will return in 1 week for suture removal.              Disclaimer: This note was prepared using a voice recognition system and is likely to have sound alike errors within the text.

## 2024-11-14 ENCOUNTER — TELEPHONE (OUTPATIENT)
Dept: INTERNAL MEDICINE | Facility: CLINIC | Age: 59
End: 2024-11-14
Payer: COMMERCIAL

## 2024-11-14 NOTE — TELEPHONE ENCOUNTER
----- Message from Casey sent at 11/14/2024  8:53 AM CST -----  Type:  Needs Medical Advice    Who Called: CHRISTOPH MENDOZA [[id number]]  Symptoms (please be specific):    How long has patient had these symptoms:    Pharmacy name and phone #:    Would the patient rather a call back or a response via [facility]?   Best Call Back Number:  [phone]  Additional Information: Patient will like a call in regards to ins   OUTPATIENT PROGRESS NOTE  TRANSITIONAL CARE MANAGEMENT - HOSPITAL DISCHARGE FOLLOW-UP      CHIEF COMPLAINT  Chief Complaint   Patient presents with   • Transitional Care Management     cellulitis          Mr. Manolo Romano  is is unaccompanied today.    SUBJECTIVE   The patient was discharged from the hospital on July 2, 2019. The Discharge Summary was reviewed. It documents that the patient was hospitalized for cellulitis involving the penis and the suprapubic area.    1. Cellulitis of the penis and suprapubic area developed over a period of one to 2 days prior to his hospitalization on June 28. He was afebrile but did have a white blood cell count elevated to 14,000. CT scan demonstrated subacute inflammation and edema to the pubic area without any abscess. Was initially placed on IV antibiotics. MRSA negative nasal swab. As his cellulitis improved and white blood cell count came back to normal he was discharged on July 2 to take Augmentin for 7 days. Just completed his last tablet today. He feels that the swelling and cellulitis has resolved completely. No residual pain or discomfort in the groin area. He has noted no swelling of the penis. No redness. Etiology of this cellulitis is not quite clear. Urology had seen him in the hospital and felt that this was more likely cellulitis as opposed epididymitis. Follow-up only as needed.    Patient Active Problem List   Diagnosis   • Essential hypertension, benign   • Benign prostatic hyperplasia   • Erectile dysfunction   • Obesity, unspecified   • Right inguinal hernia   • Hypokalemia   • History of colon polyps   • Dyslipidemia   • Elevated fasting blood sugar   • Penile cellulitis   • Acute bacterial conjunctivitis of right eye         Pertinent and un-finalized hospital performed diagnostic tests - were reviewed..    Pertinent un-finalized hospital lab tests - were reviewed.    Advanced Directives:  · Power of  for healthcare is on file    Durable  Medical Equipment/Assistive devices prescribed: none    MEDICATIONS  The discharge medication list was reviewed. Outpatient medications were updated today. He  is fully compliant with the medication regimen prescribed at the time of discharge.    HISTORIES  I have personally reviewed and updated the following Electronic Medical Record sections: Allergies, Problem List, Past Medical History, Past Surgical History and Social History.    REVIEW OF SYSTEMS      REVIEW OF SYSTEMS: REVIEW OF SYSTEMS:    The Review of Systems was reviewed and unremarkable as follows, with the changes from normal noted in the  pertinent positives at the end.  CONSTITUTIONAL:  The patient denies recent changes in weight. No diaphoresis.  No fever or chills. No significant fatigue.  HEENT:  No blurred vision, double vision, eye pain or redness.  Denies any loss of hearing or tinnitus.  No nasal congestion or rhinorrhea.  No difficulty swallowing.  CARDIOVASCULAR:  Denies any chest pain with or without exertion.  No  history of palpitations.  No lower extremity cramping with ambulation.  No lower extremity edema. No orthopnea.  LUNGS:  No cough, wheezing or shortness of breath.  GASTROINTESTINAL:  Denies any abdominal pain.  No melena or hematochezia.  No nausea, vomiting, diarrhea or constipation.  No gastroesophageal reflux disease. Appetite has been stable.  GENITOURINARY:  No dysuria, hematuria, urinary urgency or incontinence. No change in voiding stream.  MUSCULOSKELETAL:  Denies any significant joint pain or swelling.  No back or neck pain.  SKIN:  Denies any rashes, lesions or abnormal hair growth.  NEUROLOGIC:  Denies any significant headache.  Denies any dizziness or syncopal episodes.  No twitching, tremors, numbness or difficulty with speech. No focal weakness.  PSYCHIATRIC:  Denies symptoms of depression, insomnia or anxiety. Denies any suicidal ideation. Denies irritability or mood swings.  HEMATOLOGIC: Denies any swollen lymph  nodes.  No bleeding tendencies  or current symptoms of blood clots.  ENDOCRINE: No hot or cold intolerance, or excessive thirst or urination.    Pertinent positives include: Was given some antifungal powder to use in the groin area and he has completed that course of treatment today. Is on probiotics which she will continue for another 7 days. Had been taking this in the course of the Augmentin.        PHYSICAL EXAM  Visit Vitals  /74 [place] [facility] [place]itting, Cuff Size: Regular)   Pulse 68   Temp 98.4 °F (36.9 °C) (Temporal)   Ht 5' 9\" (1.753 m) Comment: with shoes on   Wt 87.9 kg   BMI 28.62 kg/m²     Constitutional:  Well developed, well nourished in no apparent distress, non-toxic appearance . Comfortable and not acutely ill appearing 73-year-old. Afebrile.  Gastrointestinal:  Soft, non tender, nondistended, normal bowel sounds, no hepatosplenomegaly. No masses palpated. Evaluation of the perineal and inguinal area reveals no inguinal nodes or masses noted. No areas of erythema or swelling redness or tenderness noted pubic region or evaluation of the penis and shaft of penis. No focal lesions noted. Testicles unremarkable to exam and scrotum without any focal tenderness or swelling. Exam for the most part not remarkable. He indicates he is back to his baseline.      ASSESSMENT AND PLAN:  1. Penile cellulitis  Has resolved completely. Completed a course of Augmentin and completing a course of antifungal powder to use in the groin area. We'll finish off 7 further days of probiotics. Indicates his stools initially were green and loose in the hospital but now are more formed and his usual consistency at home. At present no evidence of any further cellulitis and will follow-up if there is any recurrence of any problems.    2. Essential hypertension, benign  Blood pressure borderline. I'm due to see him in October for reevaluation. No changes in his status or meds at this  time        · Disease/condition/illness specific self-management education was provided to the patient. Substantive elements include:  As noted above    Patient adherence to his treatment plan was assessed. He is fully compliant with the entire discharge treatment plan.

## 2024-11-15 ENCOUNTER — LAB VISIT (OUTPATIENT)
Dept: LAB | Facility: HOSPITAL | Age: 59
End: 2024-11-15
Attending: INTERNAL MEDICINE
Payer: COMMERCIAL

## 2024-11-15 ENCOUNTER — OFFICE VISIT (OUTPATIENT)
Dept: PRIMARY CARE CLINIC | Facility: CLINIC | Age: 59
End: 2024-11-15
Payer: COMMERCIAL

## 2024-11-15 VITALS
HEART RATE: 92 BPM | DIASTOLIC BLOOD PRESSURE: 74 MMHG | SYSTOLIC BLOOD PRESSURE: 120 MMHG | WEIGHT: 130.94 LBS | TEMPERATURE: 98 F | BODY MASS INDEX: 22.48 KG/M2 | OXYGEN SATURATION: 99 %

## 2024-11-15 DIAGNOSIS — I42.9 CARDIOMYOPATHY WITH IMPLANTABLE CARDIOVERTER-DEFIBRILLATOR: ICD-10-CM

## 2024-11-15 DIAGNOSIS — Z79.4 DIABETES MELLITUS DUE TO UNDERLYING CONDITION WITH HYPERGLYCEMIA, WITH LONG-TERM CURRENT USE OF INSULIN: ICD-10-CM

## 2024-11-15 DIAGNOSIS — Z79.4 TYPE 2 DIABETES MELLITUS WITH DIABETIC POLYNEUROPATHY, WITH LONG-TERM CURRENT USE OF INSULIN: ICD-10-CM

## 2024-11-15 DIAGNOSIS — M25.50 ARTHRALGIA, UNSPECIFIED JOINT: ICD-10-CM

## 2024-11-15 DIAGNOSIS — J45.909 REACTIVE AIRWAY DISEASE WITHOUT COMPLICATION, UNSPECIFIED ASTHMA SEVERITY, UNSPECIFIED WHETHER PERSISTENT: ICD-10-CM

## 2024-11-15 DIAGNOSIS — E11.42 TYPE 2 DIABETES MELLITUS WITH DIABETIC POLYNEUROPATHY, WITH LONG-TERM CURRENT USE OF INSULIN: ICD-10-CM

## 2024-11-15 DIAGNOSIS — E08.65 DIABETES MELLITUS DUE TO UNDERLYING CONDITION WITH HYPERGLYCEMIA, WITH LONG-TERM CURRENT USE OF INSULIN: ICD-10-CM

## 2024-11-15 DIAGNOSIS — F51.04 PSYCHOPHYSIOLOGIC INSOMNIA: ICD-10-CM

## 2024-11-15 DIAGNOSIS — Z76.89 ESTABLISHING CARE WITH NEW DOCTOR, ENCOUNTER FOR: Primary | ICD-10-CM

## 2024-11-15 DIAGNOSIS — Z95.810 CARDIOMYOPATHY WITH IMPLANTABLE CARDIOVERTER-DEFIBRILLATOR: ICD-10-CM

## 2024-11-15 LAB
ALBUMIN/CREAT UR: 149.4 UG/MG (ref 0–30)
CREAT UR-MCNC: 247 MG/DL (ref 15–325)
MICROALBUMIN UR DL<=1MG/L-MCNC: 369 UG/ML

## 2024-11-15 PROCEDURE — 99999 PR PBB SHADOW E&M-EST. PATIENT-LVL III: CPT | Mod: PBBFAC,,, | Performed by: INTERNAL MEDICINE

## 2024-11-15 PROCEDURE — 82570 ASSAY OF URINE CREATININE: CPT | Performed by: INTERNAL MEDICINE

## 2024-11-15 RX ORDER — ALBUTEROL SULFATE 90 UG/1
INHALANT RESPIRATORY (INHALATION)
Qty: 54 G | Refills: 2 | Status: SHIPPED | OUTPATIENT
Start: 2024-11-15

## 2024-11-15 RX ORDER — METOPROLOL SUCCINATE 25 MG/1
25 TABLET, EXTENDED RELEASE ORAL EVERY MORNING
Qty: 90 TABLET | Refills: 3 | Status: SHIPPED | OUTPATIENT
Start: 2024-11-15 | End: 2025-11-15

## 2024-11-15 RX ORDER — ATORVASTATIN CALCIUM 20 MG/1
20 TABLET, FILM COATED ORAL NIGHTLY
Qty: 90 TABLET | Refills: 3 | Status: SHIPPED | OUTPATIENT
Start: 2024-11-15

## 2024-11-15 RX ORDER — TIRZEPATIDE 5 MG/.5ML
5 INJECTION, SOLUTION SUBCUTANEOUS
Qty: 24 PEN | Refills: 1 | Status: CANCELLED | OUTPATIENT
Start: 2024-11-15 | End: 2025-10-17

## 2024-11-15 RX ORDER — TIRZEPATIDE 5 MG/.5ML
5 INJECTION, SOLUTION SUBCUTANEOUS
Qty: 24 PEN | Refills: 1 | Status: SHIPPED | OUTPATIENT
Start: 2024-11-15 | End: 2025-10-17

## 2024-11-15 RX ORDER — SPIRONOLACTONE 25 MG/1
25 TABLET ORAL EVERY MORNING
Qty: 90 TABLET | Refills: 3 | Status: SHIPPED | OUTPATIENT
Start: 2024-11-15

## 2024-11-15 RX ORDER — ONDANSETRON 8 MG/1
8 TABLET, ORALLY DISINTEGRATING ORAL EVERY 12 HOURS PRN
Qty: 30 TABLET | Refills: 11 | Status: SHIPPED | OUTPATIENT
Start: 2024-11-15

## 2024-11-15 RX ORDER — MIRTAZAPINE 30 MG/1
30 TABLET, FILM COATED ORAL NIGHTLY
Qty: 90 TABLET | Refills: 3 | Status: SHIPPED | OUTPATIENT
Start: 2024-11-15 | End: 2025-11-15

## 2024-11-15 RX ORDER — SACUBITRIL AND VALSARTAN 24; 26 MG/1; MG/1
1 TABLET, FILM COATED ORAL 2 TIMES DAILY
Qty: 180 TABLET | Refills: 3 | Status: SHIPPED | OUTPATIENT
Start: 2024-11-15

## 2024-11-15 RX ORDER — FUROSEMIDE 40 MG/1
TABLET ORAL
Qty: 90 TABLET | Refills: 1 | Status: SHIPPED | OUTPATIENT
Start: 2024-11-15

## 2024-11-15 RX ORDER — DICLOFENAC SODIUM 10 MG/G
GEL TOPICAL 2 TIMES DAILY
Qty: 300 G | Refills: 1 | Status: SHIPPED | OUTPATIENT
Start: 2024-11-15

## 2024-11-15 RX ORDER — INSULIN LISPRO 100 [IU]/ML
10 INJECTION, SOLUTION INTRAVENOUS; SUBCUTANEOUS 3 TIMES DAILY
Qty: 27 ML | Refills: 3 | Status: SHIPPED | OUTPATIENT
Start: 2024-11-15 | End: 2025-05-14

## 2024-11-15 NOTE — TELEPHONE ENCOUNTER
----- Message from Tess sent at 11/15/2024 10:35 AM CST -----  Contact: Anna Zurita is on the line trying to reach the nurse regarding medication. Reports needing clairfication on how the pt needs to take tirzepatide (MOUNJARO) 5 mg/0.5 mL PnIj. Please give   Anna Zurita a call back at [phone]

## 2024-11-15 NOTE — PROGRESS NOTES
Subjective     Patient ID: Roopa Hicks is a 59 y.o. female.    Chief Complaint: Establish Care      HPI  Here for est care; purpose of care is transitioning internists.  Has meds but needs refills.  Originally from [facility], came here after [place].  Has a daughter who is a NP and ER Nurse at [employer] [employer].  Pt sees Dr. Josiah Yi/alisia who monitors her for defib/pacemaker.  Needs refills.  Recently had left carpal tunnel sx performed with plans for right.  Med hx d/w pt.      Past Medical History:   Diagnosis Date    Anxiety     Arthritis     Carpal tunnel syndrome, bilateral     Depression     Diabetic peripheral neuropathy associated with type 2 diabetes mellitus     Essential (primary) hypertension     Gastroparesis due to secondary diabetes     Mild intermittent asthma, uncomplicated     Osteopenia     Renal impairment     Shoulder pain, right     Type 2 diabetes mellitus without complications      Review of patient's allergies indicates:   Allergen Reactions    Grass pollen- grass standard Hives     Past Surgical History:   Procedure Laterality Date    CARPAL TUNNEL RELEASE Left 2024    Procedure: RELEASE, CARPAL TUNNEL;  Surgeon: Renato Shoemaker MD;  Location: [facility];  Service: Orthopedics;  Laterality: Left;  left carpal tunnel release     SECTION      HYSTERECTOMY      INSERTION OF PACEMAKER Bilateral 2021    NEPHRECTOMY Left      Family History   Problem Relation Name Age of Onset    Diabetes Mother      Hypertension Mother      Heart disease Mother      Diabetes Father      Hypertension Father      Heart disease Father      Cancer Maternal Grandmother       Social History     Socioeconomic History    Marital status:    Tobacco Use    Smoking status: Never    Smokeless tobacco: Never   Substance and Sexual Activity    Alcohol use: Never    Drug use: Never    Sexual activity: Not Currently     Social Drivers of Health     Financial Resource Strain: Medium Risk  "(11/11/2024)    Overall Financial Resource Strain (CARDIA)     Difficulty of Paying Living Expenses: Somewhat hard   Food Insecurity: Food Insecurity Present (11/11/2024)    Hunger Vital Sign     Worried About Running Out of Food in the Last Year: Sometimes true     Ran Out of Food in the Last Year: Sometimes true   Physical Activity: Unknown (11/11/2024)    Exercise Vital Sign     Days of Exercise per Week: 3 days   Stress: Stress Concern Present (11/11/2024)    [employer] - Occupational Stress Questionnaire     Feeling of Stress : To some extent   Housing Stability: High Risk (11/11/2024)    Housing Stability Vital Sign     Unable to Pay for Housing in the Last Year: Yes         /74   Pulse 92   Temp 97.6 °F (36.4 °C)   Wt 59.4 kg (130 lb 15.3 oz)   SpO2 99%   BMI 22.48 kg/m²   Outpatient Medications as of 11/15/2024   Medication Sig Dispense Refill    ACCU-CHEK KACI PLUS TEST STRP Strp USE TO TEST BLOOD SUGAR  each 1    ACCU-CHEK SOFTCLIX LANCETS Misc TEST BLOOD SUGAR  each 1    blood-glucose sensor (FREESTYLE ASTER 3 SENSOR) Tati CHANGE SENSOR EVERY 14     DAYS. 6 each 3    cholecalciferol, vitamin D3, 1,250 mcg (50,000 unit) capsule Take 1 capsule (50,000 Units total) by mouth once a week. 12 capsule 1    HYDROcodone-acetaminophen (NORCO) 5-325 mg per tablet Take 1 tablet by mouth every 6 (six) hours as needed for Pain. 15 tablet 0    ivabradine (CORLANOR) 5 mg Tab Take 1 tablet by mouth 2 (two) times daily.      meloxicam (MOBIC) 15 MG tablet TAKE 1 TABLET(15 MG) BY MOUTH DAILY 30 tablet 0    nebulizer and compressor Tati Use every 4-6 hrs prn for wheezing 1 each 0    pen needle, diabetic (BD ULTRA-FINE POLO PEN NEEDLE) 32 gauge x 5/32" Ndle USE ONCE DAILY WITH BASAGLAR 300 each 1    pregabalin (LYRICA) 100 MG capsule Take 1 capsule (100 mg total) by mouth 2 (two) times daily. 180 capsule 1    SITagliptin phosphate (JANUVIA) 25 MG Tab Take 25 mg by mouth " once daily.      traMADol (ULTRAM) 50 mg tablet TK 1 T PO  BID (Patient taking differently: Take 50 mg by mouth once daily.) 45 tablet 1    albuterol (PROVENTIL/VENTOLIN HFA) 90 mcg/actuation inhaler INHALE 1 TO 2 PUFFS BY MOUTH FOUR TIMES DAILY 54 g 2    atorvastatin (LIPITOR) 20 MG tablet Take 1 tablet (20 mg total) by mouth every evening. 90 tablet 3    diclofenac sodium (VOLTAREN) 1 % Gel Apply topically 2 (two) times daily. 300 g 1    furosemide (LASIX) 40 MG tablet 1 daily as needed per guidelines 90 tablet 1    insulin lispro (HUMALOG KWIKPEN INSULIN) 100 unit/mL pen Inject 10 Units into the skin 3 (three) times daily. 27 mL 3    metoprolol succinate (TOPROL-XL) 25 MG 24 hr tablet Take 1 tablet (25 mg total) by mouth every morning. 90 tablet 3    mirtazapine (REMERON) 30 MG tablet Take 1 tablet (30 mg total) by mouth every evening. 90 tablet 3    spironolactone (ALDACTONE) 25 MG tablet Take 1 tablet (25 mg total) by mouth every morning. 90 tablet 3     No current facility-administered medications on file as of 11/15/2024.       Review of Systems   All other systems reviewed and are negative.         Objective     Physical Exam  Constitutional:       Appearance: Normal appearance.   HENT:      Head: Normocephalic and atraumatic.      Right Ear: External ear normal.      Left Ear: External ear normal.   Eyes:      Conjunctiva/sclera: Conjunctivae normal.   Cardiovascular:      Rate and Rhythm: Normal rate.      Comments: Pacemaker/defib noted to left anterior chest  Pulmonary:      Effort: No respiratory distress.   Musculoskeletal:      Cervical back: Neck supple.      Right lower leg: No edema.      Left lower leg: No edema.   Neurological:      Mental Status: She is alert and oriented to person, place, and time.   Psychiatric:         Mood and Affect: Mood normal.         Behavior: Behavior normal.            Assessment and Plan     1. Establishing care with new doctor, encounter for  -     ondansetron  (ZOFRAN-ODT) 8 MG TbDL; Take 1 tablet (8 mg total) by mouth every 12 (twelve) hours as needed (nausea).  Dispense: 30 tablet; Refill: 11    2. Diabetes mellitus due to underlying condition with hyperglycemia, with long-term current use of insulin  -     Hemoglobin A1C; Future; Expected date: 11/15/2024  -     Microalbumin/creatinine urine ratio; Future; Expected date: 11/15/2024  -     empagliflozin (JARDIANCE) 10 mg tablet; Take 1 tablet (10 mg total) by mouth once daily.  Dispense: 90 tablet; Refill: 3  -     atorvastatin (LIPITOR) 20 MG tablet; Take 1 tablet (20 mg total) by mouth every evening.  Dispense: 90 tablet; Refill: 3  -     furosemide (LASIX) 40 MG tablet; 1 daily as needed per guidelines  Dispense: 90 tablet; Refill: 1  -     tirzepatide (MOUNJARO) 5 mg/0.5 mL PnIj; Inject 5 mg into the skin every 7 days. Inject   10  mg(2 shots) once a week  Dispense: 24 Pen; Refill: 1    3. Reactive airway disease without complication, unspecified asthma severity, unspecified whether persistent  -     albuterol (PROVENTIL/VENTOLIN HFA) 90 mcg/actuation inhaler; INHALE 1 TO 2 PUFFS BY MOUTH FOUR TIMES DAILY  Dispense: 54 g; Refill: 2    4. Arthralgia, unspecified joint  -     diclofenac sodium (VOLTAREN) 1 % Gel; Apply topically 2 (two) times daily.  Dispense: 300 g; Refill: 1    5. Type 2 diabetes mellitus with diabetic polyneuropathy, with long-term current use of insulin  Overview:  continue current meds on lisinopril 10 mg a day    Orders:  -     insulin lispro (HUMALOG KWIKPEN INSULIN) 100 unit/mL pen; Inject 10 Units into the skin 3 (three) times daily.  Dispense: 27 mL; Refill: 3  -     metoprolol succinate (TOPROL-XL) 25 MG 24 hr tablet; Take 1 tablet (25 mg total) by mouth every morning.  Dispense: 90 tablet; Refill: 3  -     spironolactone (ALDACTONE) 25 MG tablet; Take 1 tablet (25 mg total) by mouth every morning.  Dispense: 90 tablet; Refill: 3    6. Cardiomyopathy with implantable  cardioverter-defibrillator  -     furosemide (LASIX) 40 MG tablet; 1 daily as needed per guidelines  Dispense: 90 tablet; Refill: 1  -     metoprolol succinate (TOPROL-XL) 25 MG 24 hr tablet; Take 1 tablet (25 mg total) by mouth every morning.  Dispense: 90 tablet; Refill: 3  -     sacubitriL-valsartan (ENTRESTO) 24-26 mg per tablet; Take 1 tablet by mouth 2 (two) times daily.  Dispense: 180 tablet; Refill: 3  -     spironolactone (ALDACTONE) 25 MG tablet; Take 1 tablet (25 mg total) by mouth every morning.  Dispense: 90 tablet; Refill: 3    7. Psychophysiologic insomnia  -     mirtazapine (REMERON) 30 MG tablet; Take 1 tablet (30 mg total) by mouth every evening.  Dispense: 90 tablet; Refill: 3         6mo    Immunization History   Administered Date(s) Administered    COVID-19, MRNA, LN-S, PF (MODERNA FULL 0.5 ML DOSE) 02/27/2021, 02/27/2021, 02/27/2021, 03/29/2021, 03/29/2021, 08/17/2021, 08/17/2021, 07/09/2022    COVID-19, mRNA, LNP-S, PF (Moderna) Ages 12+ 01/23/2024    Influenza - Quadrivalent 10/09/2020    Influenza - Quadrivalent - PF *Preferred* (6 months and older) 10/23/2019, 10/09/2020, 11/30/2021, 10/07/2022, 10/06/2023    Pneumococcal Polysaccharide - 23 Valent 05/05/2023    Td (ADULT) 05/19/2005    Tdap 05/19/2005, 05/19/2005, 07/10/2020     I spent a total of 45 minutes on the day of the visit.This includes face to face time and non-face to face time preparing to see the patient (eg, review of tests), obtaining and/or reviewing separately obtained history, documenting clinical information in the electronic or other health record, independently interpreting results and communicating results to the patient/family/caregiver, or care coordinator.

## 2024-11-19 ENCOUNTER — OFFICE VISIT (OUTPATIENT)
Dept: ORTHOPEDICS | Facility: CLINIC | Age: 59
End: 2024-11-19
Payer: MEDICARE

## 2024-11-19 VITALS — HEIGHT: 64 IN | WEIGHT: 130.94 LBS | BODY MASS INDEX: 22.35 KG/M2

## 2024-11-19 DIAGNOSIS — G56.03 CARPAL TUNNEL SYNDROME, BILATERAL: Primary | ICD-10-CM

## 2024-11-19 PROCEDURE — 3060F POS MICROALBUMINURIA REV: CPT | Mod: CPTII,S$GLB,, | Performed by: ORTHOPAEDIC SURGERY

## 2024-11-19 PROCEDURE — 3066F NEPHROPATHY DOC TX: CPT | Mod: CPTII,S$GLB,, | Performed by: ORTHOPAEDIC SURGERY

## 2024-11-19 PROCEDURE — 99999 PR PBB SHADOW E&M-EST. PATIENT-LVL IV: CPT | Mod: PBBFAC,,, | Performed by: ORTHOPAEDIC SURGERY

## 2024-11-19 PROCEDURE — 1160F RVW MEDS BY RX/DR IN RCRD: CPT | Mod: CPTII,S$GLB,, | Performed by: ORTHOPAEDIC SURGERY

## 2024-11-19 PROCEDURE — 3044F HG A1C LEVEL LT 7.0%: CPT | Mod: CPTII,S$GLB,, | Performed by: ORTHOPAEDIC SURGERY

## 2024-11-19 PROCEDURE — 99024 POSTOP FOLLOW-UP VISIT: CPT | Mod: S$GLB,,, | Performed by: ORTHOPAEDIC SURGERY

## 2024-11-19 PROCEDURE — 1159F MED LIST DOCD IN RCRD: CPT | Mod: CPTII,S$GLB,, | Performed by: ORTHOPAEDIC SURGERY

## 2024-11-19 PROCEDURE — 4010F ACE/ARB THERAPY RXD/TAKEN: CPT | Mod: CPTII,S$GLB,, | Performed by: ORTHOPAEDIC SURGERY

## 2024-11-19 NOTE — PROGRESS NOTES
Subjective:     Patient ID: Roopa Hicks is a 59 y.o. female.    Chief Complaint: Pain and Post-op Evaluation of the Left Hand      HPI:  The patient is a 59-year-old female status post left carpal tunnel release date of surgery was 2024.  She reports for suture removal today.    Past Medical History:   Diagnosis Date    Anxiety     Arthritis     Carpal tunnel syndrome, bilateral     Depression     Diabetic peripheral neuropathy associated with type 2 diabetes mellitus     Essential (primary) hypertension     Gastroparesis due to secondary diabetes     Mild intermittent asthma, uncomplicated     Osteopenia     Renal impairment     Shoulder pain, right     Type 2 diabetes mellitus without complications      Past Surgical History:   Procedure Laterality Date    CARPAL TUNNEL RELEASE Left 2024    Procedure: RELEASE, CARPAL TUNNEL;  Surgeon: Renato Shoemaker MD;  Location: [facility];  Service: Orthopedics;  Laterality: Left;  left carpal tunnel release     SECTION      HYSTERECTOMY      INSERTION OF PACEMAKER Bilateral 2021    NEPHRECTOMY Left      Family History   Problem Relation Name Age of Onset    Diabetes Mother      Hypertension Mother      Heart disease Mother      Diabetes Father      Hypertension Father      Heart disease Father      Cancer Maternal Grandmother       Social History     Socioeconomic History    Marital status:    Tobacco Use    Smoking status: Never    Smokeless tobacco: Never   Substance and Sexual Activity    Alcohol use: Never    Drug use: Never    Sexual activity: Not Currently     Social Drivers of Health     Financial Resource Strain: Medium Risk (2024)    Overall Financial Resource Strain (CARDIA)     Difficulty of Paying Living Expenses: Somewhat hard   Food Insecurity: Food Insecurity Present (2024)    Hunger Vital Sign     Worried About Running Out of Food in the Last Year: Sometimes true     Ran Out of Food in the Last Year:  Sometimes true   Physical Activity: Unknown (11/11/2024)    Exercise Vital Sign     Days of Exercise per Week: 3 days   Stress: Stress Concern Present (11/11/2024)    Citizen of [place] of Occupational Health - Occupational Stress Questionnaire     Feeling of Stress : To some extent   Housing Stability: High Risk (11/11/2024)    Housing Stability Vital Sign     Unable to Pay for Housing in the Last Year: Yes     Medication List with Changes/Refills   Current Medications    ACCU-CHEK KACI PLUS TEST STRP STRP    USE TO TEST BLOOD SUGAR TID    ACCU-CHEK SOFTCLIX LANCETS MISC    TEST BLOOD SUGAR TID    ALBUTEROL (PROVENTIL/VENTOLIN HFA) 90 MCG/ACTUATION INHALER    INHALE 1 TO 2 PUFFS BY MOUTH FOUR TIMES DAILY    ATORVASTATIN (LIPITOR) 20 MG TABLET    Take 1 tablet (20 mg total) by mouth every evening.    BLOOD-GLUCOSE SENSOR ([employer] SportsSTYLE ASTER 3 SENSOR) RIANNA    CHANGE SENSOR EVERY 14     DAYS.    CHOLECALCIFEROL, VITAMIN D3, 1,250 MCG (50,000 UNIT) CAPSULE    Take 1 capsule (50,000 Units total) by mouth once a week.    DICLOFENAC SODIUM (VOLTAREN) 1 % GEL    Apply topically 2 (two) times daily.    EMPAGLIFLOZIN (JARDIANCE) 10 MG TABLET    Take 1 tablet (10 mg total) by mouth once daily.    FUROSEMIDE (LASIX) 40 MG TABLET    1 daily as needed per guidelines    HYDROCODONE-ACETAMINOPHEN (NORCO) 5-325 MG PER TABLET    Take 1 tablet by mouth every 6 (six) hours as needed for Pain.    INSULIN LISPRO (HUMALOG KWIKPEN INSULIN) 100 UNIT/ML PEN    Inject 10 Units into the skin 3 (three) times daily.    IVABRADINE (CORLANOR) 5 MG TAB    Take 1 tablet by mouth 2 (two) times daily.    MELOXICAM (MOBIC) 15 MG TABLET    TAKE 1 TABLET(15 MG) BY MOUTH DAILY    METOPROLOL SUCCINATE (TOPROL-XL) 25 MG 24 HR TABLET    Take 1 tablet (25 mg total) by mouth every morning.    MIRTAZAPINE (REMERON) 30 MG TABLET    Take 1 tablet (30 mg total) by mouth every evening.    NEBULIZER AND COMPRESSOR RIANNA    Use every 4-6 hrs prn for wheezing     "ONDANSETRON (ZOFRAN-ODT) 8 MG TBDL    Take 1 tablet (8 mg total) by mouth every 12 (twelve) hours as needed (nausea).    PEN NEEDLE, DIABETIC (BD ULTRA-FINE POLO PEN NEEDLE) 32 GAUGE X 5/32" NDLE    USE ONCE DAILY WITH BASAGLAR    PREGABALIN (LYRICA) 100 MG CAPSULE    Take 1 capsule (100 mg total) by mouth 2 (two) times daily.    SACUBITRIL-VALSARTAN (ENTRESTO) 24-26 MG PER TABLET    Take 1 tablet by mouth 2 (two) times daily.    SITAGLIPTIN PHOSPHATE (JANUVIA) 25 MG TAB    Take 25 mg by mouth once daily.    SPIRONOLACTONE (ALDACTONE) 25 MG TABLET    Take 1 tablet (25 mg total) by mouth every morning.    TIRZEPATIDE (MOUNJARO) 5 MG/0.5 ML PNIJ    Inject 5 mg into the skin every 7 days. Inject   10  mg(2 shots) once a week    TRAMADOL (ULTRAM) 50 MG TABLET    TK 1 T PO  BID     Review of patient's allergies indicates:   Allergen Reactions    Grass pollen-june grass standard Hives     Review of Systems   Constitutional: Negative for malaise/fatigue.   HENT:  Negative for hearing loss.    Eyes:  Negative for double vision and visual disturbance.   Cardiovascular:  Negative for chest pain.   Respiratory:  Positive for wheezing. Negative for shortness of breath.    Endocrine: Negative for cold intolerance.   Hematologic/Lymphatic: Does not bruise/bleed easily.   Skin:  Negative for poor wound healing and suspicious lesions.   Musculoskeletal:  Positive for arthritis, joint pain, joint swelling and neck pain. Negative for gout.   Gastrointestinal:  Positive for constipation and diarrhea. Negative for nausea and vomiting.   Genitourinary:  Negative for dysuria.   Neurological:  Positive for focal weakness, numbness, paresthesias and sensory change.   Psychiatric/Behavioral:  Positive for altered mental status and depression. Negative for memory loss and substance abuse. The patient is nervous/anxious.    Allergic/Immunologic: Negative for persistent infections.       Objective:   Body mass index is 22.48 kg/m².  There were " no vitals filed for this visit.              radiographs were not obtained today  Assessment:     Encounter Diagnosis   Name Primary?    Carpal tunnel syndrome, bilateral Yes    Status post left carpal tunnel release  Plan:         The patient had the sutures removed today.  Steri-Strips were applied.  Wound care was discussed.  She will return on a as needed basis.  She seems to be doing well.            Disclaimer: This note was prepared using a voice recognition system and is likely to have sound alike errors within the text.

## 2024-12-03 ENCOUNTER — PATIENT OUTREACH (OUTPATIENT)
Dept: ADMINISTRATIVE | Facility: HOSPITAL | Age: 59
End: 2024-12-03
Payer: COMMERCIAL

## 2024-12-03 NOTE — PROGRESS NOTES
VBHM Score: 2     Colon Cancer Screening  Mammogram    Influenza Vaccine  Shingles/Zoster Vaccine  RSV Vaccine            LVM for patient and sent portal message.

## 2024-12-05 ENCOUNTER — OFFICE VISIT (OUTPATIENT)
Dept: PRIMARY CARE CLINIC | Facility: CLINIC | Age: 59
End: 2024-12-05
Payer: COMMERCIAL

## 2024-12-05 VITALS
BODY MASS INDEX: 22.57 KG/M2 | SYSTOLIC BLOOD PRESSURE: 118 MMHG | HEART RATE: 91 BPM | WEIGHT: 131.5 LBS | DIASTOLIC BLOOD PRESSURE: 70 MMHG | OXYGEN SATURATION: 97 % | TEMPERATURE: 98 F

## 2024-12-05 DIAGNOSIS — Z09 HOSPITAL DISCHARGE FOLLOW-UP: Primary | ICD-10-CM

## 2024-12-05 DIAGNOSIS — Z79.4 DIABETES MELLITUS DUE TO UNDERLYING CONDITION WITH HYPERGLYCEMIA, WITH LONG-TERM CURRENT USE OF INSULIN: ICD-10-CM

## 2024-12-05 DIAGNOSIS — I50.43: ICD-10-CM

## 2024-12-05 DIAGNOSIS — E11.42 TYPE 2 DIABETES MELLITUS WITH DIABETIC POLYNEUROPATHY, WITH LONG-TERM CURRENT USE OF INSULIN: ICD-10-CM

## 2024-12-05 DIAGNOSIS — E11.59 HYPERTENSION ASSOCIATED WITH TYPE 2 DIABETES MELLITUS: ICD-10-CM

## 2024-12-05 DIAGNOSIS — E08.65 DIABETES MELLITUS DUE TO UNDERLYING CONDITION WITH HYPERGLYCEMIA, WITH LONG-TERM CURRENT USE OF INSULIN: ICD-10-CM

## 2024-12-05 DIAGNOSIS — I15.2 HYPERTENSION ASSOCIATED WITH TYPE 2 DIABETES MELLITUS: ICD-10-CM

## 2024-12-05 DIAGNOSIS — Z79.4 TYPE 2 DIABETES MELLITUS WITH DIABETIC POLYNEUROPATHY, WITH LONG-TERM CURRENT USE OF INSULIN: ICD-10-CM

## 2024-12-05 LAB — GLUCOSE SERPL-MCNC: 123 MG/DL (ref 70–110)

## 2024-12-05 PROCEDURE — 82962 GLUCOSE BLOOD TEST: CPT | Mod: S$GLB,,, | Performed by: INTERNAL MEDICINE

## 2024-12-05 PROCEDURE — 99999 PR PBB SHADOW E&M-EST. PATIENT-LVL III: CPT | Mod: PBBFAC,,, | Performed by: INTERNAL MEDICINE

## 2024-12-05 RX ORDER — BUMETANIDE 1 MG/1
1 TABLET ORAL DAILY PRN
COMMUNITY
Start: 2024-11-23 | End: 2024-12-23

## 2024-12-05 RX ORDER — MECLIZINE HCL 12.5 MG 12.5 MG/1
12.5 TABLET ORAL
COMMUNITY
Start: 2024-11-30 | End: 2024-12-05

## 2024-12-05 RX ORDER — INSULIN LISPRO 100 [IU]/ML
10 INJECTION, SOLUTION INTRAVENOUS; SUBCUTANEOUS 3 TIMES DAILY
Qty: 27 ML | Refills: 3 | Status: SHIPPED | OUTPATIENT
Start: 2024-12-05 | End: 2025-06-03

## 2024-12-05 RX ORDER — TIRZEPATIDE 10 MG/.5ML
10 INJECTION, SOLUTION SUBCUTANEOUS
Qty: 24 PEN | Refills: 1 | Status: SHIPPED | OUTPATIENT
Start: 2024-12-05

## 2024-12-05 NOTE — PROGRESS NOTES
Subjective     Patient ID: Roopa Hicks is a 59 y.o. female.    Chief Complaint: Follow-up (ER follow up Acute CHF/Wants to discuss hospital medication with MD before taking/Requesting insulin)      HPI  recent hosp stay.  R/w and d/w pt in detail.  She actually hasn't been taking any of the medication as adjusted in the hospital.  She states she didn't feel confident in the team.  We discussed her medications and plan as well as reasoning for the medications in detail.  Has upcoming f/u with Dr. Yi.  Meds and labs r/w pt.  She is tired but feels ok today.  Daughter on phone helps with hx.    Discharge Summaries  - documented in this encounter   Brandin Winters MD - 11/24/2024 9:43 AM CST  Formatting of this note is different from the original.  Admit Date 11/20/2024  Discharge Date 11/24/2024  Location [place] [address]  Treatment Team Primary Care Physician: Khushboo Hall MD  Discharging Physician: Brandin Winters MD  Treatment Team:  Attending Provider: Brandin Winters MD  Consulting Physician: Service, [facility]  Hospitalist: [id number]  Hospitalist: [phone]  Admitting Provider: Jack Kwon MD        Reason for Hospitalization  Patient initially presenting with pedal edema and orthopnea concerning for decompensated heart failure. Responded very well to IV diuresis which led to orthostatic dizziness. She responded favorably to IV fluids and meclizine is stable for discharge on 11/24.    * Acute on chronic combined systolic and diastolic congestive heart failure (HCC)  Patient presenting with orthopnea and dyspnea on exertion and found to have elevated BNP all of which are concerning for decompensated systolic heart failure. She received 2 dose of IV Lasix on 11/20 with good diuretic output and was switched to oral Bumex 1 mg on 11/21. Concern for overdiuresis as she is currently experiencing some nausea and lightheadedness/dizziness  worsened with exertion.  Held diuretics and GDMT and gave IV fluids with improvement in orthostasis.  Bumex as needed at discharge. Give strict instructions to avoid feeling dizzy.  Holding Entresto and Aldactone with concern for persistent orthostasis after diuresis. Follow-up with outpatient with cardiology/PCP for reinitiation    Hypernatremia  Patient with free water deficit possibly due to overdiuresis and continuation of Jardiance with glucosuria. Could be contributing to persistent dizziness as above. Volume resuscitating with half-normal saline. Normalized on day of discharge with improvement in her dizziness.    Dizziness  Patient developed worsening acute on chronic dizziness/lightheadedness during hospital stay. Apparently has been struggling with some degree of what sounds like vertigo for about 2 months, however had worsening dizziness this hospital stay and suffered a fall without trauma on 11/23. Concern for overdiuresis and patient improved with IV fluid resuscitation. Some chronicity to her dizziness which may be related to severe, chronic HFrEF leading to poor cerebral perfusion.  Symptoms improved with meclizine. Prescribing short course at discharge. Will need close follow-up with PCP for further workup outpatient.  Holding any sedating meds including home meds of Lexapro, gabapentin, Remeron, and tramadol    Type 2 diabetes mellitus, with long-term current use of insulin (HCC)  Most recent A1c of 6.9% on outpatient regimen of Januvia, glipizide, and Humalog. Continue diabetic meds at discharge.    Reactive airway disease without complication  Continue home inhalers    Hepatic congestion  Evidence of mild hepatic congestion in setting of decompensated CHF with elevated ALT. Management of CHF as above. Resolved.    Benign essential hypertension  Patient now with persistent orthostasis and dizziness/lightheadedness after relative small amounts of diuresis. Concern for overdiuresis and  overmedication so we will hold her Entresto and Aldactone. Can restart outpatient after discharge.      Final Medication List      ACTIVE    ACCU-CHEK KACI PLUS TEST STRIP  Generic drug: blood sugar diagnostic  USE TO TEST BLOOD SUGAR TID    albuterol 2.5 mg /3 mL (0.083 %) nebulizer solution  INHALE 1 VIAL VIA NEBULIZER FOUR TIMES DAILY AS NEEDED FOR WHEEZING    albuterol HFA 90 mcg/actuation inhaler  INHALE 1 TO 2 PUFFS BY MOUTH FOUR TIMES DAILY    atorvastatin 40 mg tablet  Commonly known as: LIPITOR  40 mg, Oral, Nightly    bumetanide 1 mg tablet  Commonly known as: BUMEX  1 mg, Oral, Daily as needed    cholecalciferol (vitamin D3) 1,250 mcg (50,000 unit) capsule  50,000 Units, Oral, Weekly    FreeStyle Tono 14 Day [place]  Generic drug: flash glucose scanning reader  USE UTD    flash glucose scanning reader  Use as directed.    FreeStyle Tono 14 Day Sensor Kit  Generic drug: flash glucose sensor  Use as directed.    flash glucose sensor Kit  Use as directed.    gabapentin 600 MG tablet  Commonly known as: NEURONTIN  600 mg, Oral, 3 times daily PRN    glipiZIDE 2.5 mg 24 hr tablet  Commonly known as: GLUCOTROL  2.5 mg, Oral, Daily    HumaLOG KwikPen Insulin 100 unit/mL Inpn  Generic drug: insulin lispro  INJECT 10 UNITS UNDER THE SKIN THREE TIMES DAILY    lancets Misc  TEST BLOOD SUGAR TID    meclizine 12.5 mg tablet  Commonly known as: ANTIVERT  12.5 mg, Oral, 3 times daily PRN    metoprolol succinate XL 25 mg 24 hr tablet  Commonly known as: TOPROL-XL  25 mg, Oral, Daily    mirtazapine 15 mg tablet  Commonly known as: REMERON  1 tablet, Oral, Daily    Mounjaro 10 mg/0.5 mL Pnij  Generic drug: tirzepatide    mupirocin 2 % ointment  Commonly known as: BACTROBAN  Topical, 2 times daily    ondansetron ODT 8 mg disintegrating tablet  Commonly known as: ZOFRAN-ODT  8 mg, Oral, Every 8 hours PRN    pregabalin 75 mg capsule  Commonly known as: LYRICA  75 mg, Oral, 2 times daily    SITagliptin phosphate 100 mg  tablet  Commonly known as: JANUVIA  100 mg, Oral, Daily    Vios Aerosol Delivery System Tati  Generic drug: nebulizer and compressor  See admin instructions          Significant Medication Changes:    START taking:  bumetanide (BUMEX)  meclizine (ANTIVERT)  STOP taking:  acetaminophen-codeine 300-30 mg per tablet (TYLENOL #3)  empagliflozin 10 mg Tab (Jardiance)  Entresto 24-26 mg tablet (sacubitriL-valsartan)  escitalopram 20 mg tablet (LEXAPRO)  lisdexamfetamine 60 MG capsule (Vyvanse)  lisinopriL 40 MG tablet (PRINIVIL,ZESTRIL)  methocarbamoL 750 mg tablet (ROBAXIN)  spironolactone 25 mg tablet (ALDACTONE)  traMADoL 50 mg tablet (ULTRAM)      Condition: Stable, per my exam    Disposition: Home    Time Spent on Discharge: 37 minutes.    Quality: Ejection Fraction assessment ordered to be done prior to discharge. See chart for full details.    Scheduled Appts    Follow-up with primary MD in 5-7 days    Follow-up with primary MD in 5-7 days    Wednesday Nov 27, 2024  YUE Follow Up with [facility] YUE TEAM C at 11:00 AM    Where: [facility]    Friday Dec 20, 2024  Established Patient with Josiah Yi Jr., MD at 10:00 AM    Where: [facility]    Friday May 09, 2025  Established Patient with Josiah Yi Jr., MD at 10:00 AM    Where: [facility]    [facility]  [facility]  [address]  [address]  [place] [address] [phone]        Diet    Discharge Diet  Patient Type: Adult - Diet  Diet-Adult Home Diet Type: Cardiac  Diabetic    Discharge Diet  Patient Type: Adult - Diet  Diet-Adult Home Diet Type: Cardiac  Diabetic          Activity    Normal activity as  tolerated  Normal activity as tolerated        Other    Call MD for Worsening Symptoms  Call MD for Worsening Symptoms  Discharge Condition: Stabilized  Discharge Condition: Stabilized   Past Medical History:   Diagnosis Date    Anxiety     Arthritis     Carpal tunnel syndrome, bilateral     Depression     Diabetic peripheral neuropathy associated with type 2 diabetes mellitus     Essential (primary) hypertension     Gastroparesis due to secondary diabetes     Mild intermittent asthma, uncomplicated     Osteopenia     Renal impairment     Shoulder pain, right     Type 2 diabetes mellitus without complications      Review of patient's allergies indicates:   Allergen Reactions    Grass pollen- grass standard Hives     Past Surgical History:   Procedure Laterality Date    CARPAL TUNNEL RELEASE Left 2024    Procedure: RELEASE, CARPAL TUNNEL;  Surgeon: Renato Shoemaker MD;  Location: [facility];  Service: Orthopedics;  Laterality: Left;  left carpal tunnel release     SECTION      HYSTERECTOMY      INSERTION OF PACEMAKER Bilateral 2021    NEPHRECTOMY Left      Family History   Problem Relation Name Age of Onset    Diabetes Mother      Hypertension Mother      Heart disease Mother      Diabetes Father      Hypertension Father      Heart disease Father      Cancer Maternal Grandmother       Social History     Socioeconomic History    Marital status:    Tobacco Use    Smoking status: Never    Smokeless tobacco: Never   Substance and Sexual Activity    Alcohol use: Never    Drug use: Never    Sexual activity: Not Currently     Social Drivers of Health     Financial Resource Strain: Medium Risk (2024)    Overall Financial Resource Strain (CARDIA)     Difficulty of Paying Living Expenses: Somewhat hard   Food Insecurity: Patient Unable To Answer (2024)    Received from [facility] Its Subsidiaries and Affiliates    Hunger Vital Sign      Worried About Running Out of Food in the Last Year: Patient unable to answer     Ran Out of Food in the Last Year: Patient unable to answer   Recent Concern: Food Insecurity - Food Insecurity Present (11/11/2024)    Hunger Vital Sign     Worried About Running Out of Food in the Last Year: Sometimes true     Ran Out of Food in the Last Year: Sometimes true   Transportation Needs: Patient Unable To Answer (11/20/2024)    [facility]    PRAPARE - Transportation     Lack of Transportation (Medical): Patient unable to answer     Lack of Transportation (Non-Medical): Patient unable to answer   Physical Activity: Unknown (11/11/2024)    Exercise Vital Sign     Days of Exercise per Week: 3 days   Stress: Stress Concern Present (11/11/2024)    [place] [employer] Occupational Health - Occupational Stress Questionnaire     Feeling of Stress : To some extent   Housing Stability: Low Risk  (11/20/2024)    [facility]    Housing Stability Vital Sign     Unable to Pay for Housing in the Last Year: No     Number of Times Moved in the Last Year: 1     Homeless in the Last Year: No   Recent Concern: Housing Stability - High Risk (11/11/2024)    Housing Stability Vital Sign     Unable to Pay for Housing in the Last Year: Yes         /70   Pulse 91   Temp 97.5 °F (36.4 °C) (Tympanic)   Wt 59.6 kg (131 lb 8.1 oz)   SpO2 97%   BMI 22.57 kg/m²   Outpatient Medications as of 12/5/2024   Medication Sig Dispense Refill    ACCU-CHEK KACI PLUS TEST STRP Strp USE TO TEST BLOOD SUGAR  each 1    ACCU-CHEK SOFTCLIX LANCETS Misc TEST BLOOD SUGAR  each 1    albuterol (PROVENTIL/VENTOLIN HFA) 90 mcg/actuation inhaler INHALE 1 TO 2 PUFFS BY MOUTH FOUR TIMES DAILY 54 g 2    atorvastatin (LIPITOR) 20 MG tablet Take 1 tablet (20 mg total) by mouth every  "evening. 90 tablet 3    blood-glucose sensor (FREESTYLE ASTER 3 SENSOR) Tati CHANGE SENSOR EVERY 14     DAYS. 6 each 3    cholecalciferol, vitamin D3, 1,250 mcg (50,000 unit) capsule Take 1 capsule (50,000 Units total) by mouth once a week. 12 capsule 1    diclofenac sodium (VOLTAREN) 1 % Gel Apply topically 2 (two) times daily. 300 g 1    empagliflozin (JARDIANCE) 10 mg tablet Take 1 tablet (10 mg total) by mouth once daily. 90 tablet 3    furosemide (LASIX) 40 MG tablet 1 daily as needed per guidelines 90 tablet 1    HYDROcodone-acetaminophen (NORCO) 5-325 mg per tablet Take 1 tablet by mouth every 6 (six) hours as needed for Pain. 15 tablet 0    ivabradine (CORLANOR) 5 mg Tab Take 1 tablet by mouth 2 (two) times daily.      meloxicam (MOBIC) 15 MG tablet TAKE 1 TABLET(15 MG) BY MOUTH DAILY 30 tablet 0    metoprolol succinate (TOPROL-XL) 25 MG 24 hr tablet Take 1 tablet (25 mg total) by mouth every morning. 90 tablet 3    mirtazapine (REMERON) 30 MG tablet Take 1 tablet (30 mg total) by mouth every evening. 90 tablet 3    nebulizer and compressor Tati Use every 4-6 hrs prn for wheezing 1 each 0    ondansetron (ZOFRAN-ODT) 8 MG TbDL Take 1 tablet (8 mg total) by mouth every 12 (twelve) hours as needed (nausea). 30 tablet 11    pen needle, diabetic (BD ULTRA-FINE POLO PEN NEEDLE) 32 gauge x 5/32" Ndle USE ONCE DAILY WITH BASAGLAR 300 each 1    pregabalin (LYRICA) 100 MG capsule Take 1 capsule (100 mg total) by mouth 2 (two) times daily. 180 capsule 1    sacubitriL-valsartan (ENTRESTO) 24-26 mg per tablet Take 1 tablet by mouth 2 (two) times daily. 180 tablet 3    spironolactone (ALDACTONE) 25 MG tablet Take 1 tablet (25 mg total) by mouth every morning. 90 tablet 3    traMADol (ULTRAM) 50 mg tablet TK 1 T PO  BID (Patient taking differently: Take 50 mg by mouth once daily.) 45 tablet 1    bumetanide (BUMEX) 1 MG tablet Take 1 mg by mouth daily as needed. (Patient not taking: Reported on 12/5/2024)       No current " facility-administered medications on file as of 12/5/2024.       Review of Systems   All other systems reviewed and are negative.         Objective     Physical Exam  Constitutional:       General: She is not in acute distress.     Appearance: Normal appearance. She is not ill-appearing, toxic-appearing or diaphoretic.   HENT:      Head: Normocephalic and atraumatic.      Mouth/Throat:      Mouth: Mucous membranes are moist.   Eyes:      Conjunctiva/sclera: Conjunctivae normal.   Cardiovascular:      Rate and Rhythm: Normal rate and regular rhythm.      Heart sounds: Murmur heard.      No friction rub. No gallop.      Comments: +icd left chest wall  Pulmonary:      Effort: Pulmonary effort is normal. No respiratory distress.      Breath sounds: Normal breath sounds. No wheezing or rales.   Musculoskeletal:      Cervical back: Neck supple.   Skin:     General: Skin is dry.   Neurological:      General: No focal deficit present.      Mental Status: She is alert and oriented to person, place, and time.   Psychiatric:         Mood and Affect: Mood normal.         Behavior: Behavior normal.            Assessment and Plan     1. Hospital discharge follow-up    2. Hypertension associated with type 2 diabetes mellitus  -     POCT Glucose, Hand-Held Device  -     insulin lispro (HUMALOG KWIKPEN INSULIN) 100 unit/mL pen; Inject 10 Units into the skin 3 (three) times daily.  Dispense: 27 mL; Refill: 3    3. Type 2 diabetes mellitus with diabetic polyneuropathy, with long-term current use of insulin  Overview:  continue current meds on lisinopril 10 mg a day    Orders:  -     insulin lispro (HUMALOG KWIKPEN INSULIN) 100 unit/mL pen; Inject 10 Units into the skin 3 (three) times daily.  Dispense: 27 mL; Refill: 3    4. Diabetes mellitus due to underlying condition with hyperglycemia, with long-term current use of insulin    5. Acute on chronic heart failure with reduced ejection fraction (HFrEF, <= 40%) and combined systolic and  diastolic dysfunction  Comments:  has appt f/u with cards; ICD in place; titrating up on Entresto as able.  restart low dose Aldactone; daily wt    Other orders  -     tirzepatide (MOUNJARO) 10 mg/0.5 mL PnIj; Inject 10 mg into the skin every 7 days.  Dispense: 24 Pen; Refill: 1         Send bp and bg log.  F/u with cards as planned.    Immunization History   Administered Date(s) Administered    COVID-19, MRNA, LN-S, PF (MODERNA FULL 0.5 ML DOSE) 02/27/2021, 02/27/2021, 02/27/2021, 03/29/2021, 03/29/2021, 08/17/2021, 08/17/2021, 07/09/2022    COVID-19, mRNA, LNP-S, PF (Moderna) Ages 12+ 01/23/2024    Influenza - Quadrivalent 10/09/2020    Influenza - Quadrivalent - PF *Preferred* (6 months and older) 10/23/2019, 10/09/2020, 11/30/2021, 10/07/2022, 10/06/2023    Pneumococcal Polysaccharide - 23 Valent 05/05/2023    Td (ADULT) 05/19/2005    Tdap 05/19/2005, 05/19/2005, 07/10/2020       Visit today included increased complexity associated with the care of the episodic problem acute heart failure addressed and managing the longitudinal care of the patient due to the serious and/or complex managed problem(s) dm, htn, chronic HF.

## 2024-12-18 NOTE — TELEPHONE ENCOUNTER
10 mg clarified   April 5, 2023  Kia Rogers CNM  to Obg Triage Nurse Message Pool [facility] • Me        10:59 AM  Please prescribe zofran, 4 mg q 6 hours prn nausea. ODT is fine.     Thank you,     Kia

## 2024-12-18 NOTE — TELEPHONE ENCOUNTER
----- Message from Marybeth sent at 12/18/2024 11:21 AM CST -----  Contact: Lurdes @ [facility] pharmacy [phone]  Pharmacy is calling to clarify an RX.    RX name: tirzepatide (MOUNJARO) 10 mg/0.5 mL PnIj     What do they need to clarify:  dose     Comments:

## 2024-12-23 ENCOUNTER — HOSPITAL ENCOUNTER (OUTPATIENT)
Dept: PREADMISSION TESTING | Facility: HOSPITAL | Age: 59
Discharge: HOME | End: 2024-12-23
Attending: FAMILY MEDICINE
Payer: COMMERCIAL

## 2025-01-14 ENCOUNTER — OFFICE VISIT (OUTPATIENT)
Dept: ORTHOPEDICS | Facility: CLINIC | Age: 60
End: 2025-01-14
Payer: COMMERCIAL

## 2025-01-14 VITALS — BODY MASS INDEX: 22.43 KG/M2 | HEIGHT: 64 IN | WEIGHT: 131.38 LBS

## 2025-01-14 DIAGNOSIS — G56.03 CARPAL TUNNEL SYNDROME, BILATERAL: Primary | ICD-10-CM

## 2025-01-14 PROCEDURE — 1160F RVW MEDS BY RX/DR IN RCRD: CPT | Mod: CPTII,S$GLB,, | Performed by: ORTHOPAEDIC SURGERY

## 2025-01-14 PROCEDURE — 20526 THER INJECTION CARP TUNNEL: CPT | Mod: RT,S$GLB,, | Performed by: ORTHOPAEDIC SURGERY

## 2025-01-14 PROCEDURE — 99213 OFFICE O/P EST LOW 20 MIN: CPT | Mod: 25,S$GLB,, | Performed by: ORTHOPAEDIC SURGERY

## 2025-01-14 PROCEDURE — 1159F MED LIST DOCD IN RCRD: CPT | Mod: CPTII,S$GLB,, | Performed by: ORTHOPAEDIC SURGERY

## 2025-01-14 PROCEDURE — 3008F BODY MASS INDEX DOCD: CPT | Mod: CPTII,S$GLB,, | Performed by: ORTHOPAEDIC SURGERY

## 2025-01-14 PROCEDURE — 99999 PR PBB SHADOW E&M-EST. PATIENT-LVL III: CPT | Mod: PBBFAC,,, | Performed by: ORTHOPAEDIC SURGERY

## 2025-01-14 RX ORDER — TRIAMCINOLONE ACETONIDE 40 MG/ML
40 INJECTION, SUSPENSION INTRA-ARTICULAR; INTRAMUSCULAR
Status: DISCONTINUED | OUTPATIENT
Start: 2025-01-14 | End: 2025-01-14 | Stop reason: HOSPADM

## 2025-01-14 RX ADMIN — TRIAMCINOLONE ACETONIDE 40 MG: 40 INJECTION, SUSPENSION INTRA-ARTICULAR; INTRAMUSCULAR at 09:01

## 2025-01-14 NOTE — PROGRESS NOTES
Subjective:     Patient ID: Roopa Hicks is a 59 y.o. female.    Chief Complaint: Pain of the Right Hand and Pain of the Left Hand      HPI:  The patient is a 59-year-old female status post left carpal tunnel release 2024.  She is doing well with that.  She was last injected right carpal tunnel 2024 and requests reinjection right carpal tunnel.    Past Medical History:   Diagnosis Date    Anxiety     Arthritis     Carpal tunnel syndrome, bilateral     Depression     Diabetic peripheral neuropathy associated with type 2 diabetes mellitus     Essential (primary) hypertension     Gastroparesis due to secondary diabetes     Mild intermittent asthma, uncomplicated     Osteopenia     Renal impairment     Shoulder pain, right     Type 2 diabetes mellitus without complications      Past Surgical History:   Procedure Laterality Date    CARPAL TUNNEL RELEASE Left 2024    Procedure: RELEASE, CARPAL TUNNEL;  Surgeon: Renato Shoemaker MD;  Location: [facility];  Service: Orthopedics;  Laterality: Left;  left carpal tunnel release     SECTION      HYSTERECTOMY      INSERTION OF PACEMAKER Bilateral 2021    NEPHRECTOMY Left      Family History   Problem Relation Name Age of Onset    Diabetes Mother      Hypertension Mother      Heart disease Mother      Diabetes Father      Hypertension Father      Heart disease Father      Cancer Maternal Grandmother       Social History     Socioeconomic History    Marital status:    Tobacco Use    Smoking status: Never    Smokeless tobacco: Never   Substance and Sexual Activity    Alcohol use: Never    Drug use: Never    Sexual activity: Not Currently     Social Drivers of Health     Financial Resource Strain: Medium Risk (2024)    Overall Financial Resource Strain (CARDIA)     Difficulty of Paying Living Expenses: Somewhat hard   Food Insecurity: No Food Insecurity (2025)    Received from [facility]  and Its Subsidiaries and Affiliates    Hunger Vital Sign     Worried About Running Out of Food in the Last Year: Never true     Ran Out of Food in the Last Year: Never true   Recent Concern: Food Insecurity - Food Insecurity Present (11/11/2024)    Hunger Vital Sign     Worried About Running Out of Food in the Last Year: Sometimes true     Ran Out of Food in the Last Year: Sometimes true   Transportation Needs: No Transportation Needs (1/6/2025)    [facility]    PRAPARE - Transportation     Lack of Transportation (Medical): No     Lack of Transportation (Non-Medical): No   Physical Activity: Unknown (11/11/2024)    Exercise Vital Sign     Days of Exercise per Week: 3 days   Stress: Stress Concern Present (11/11/2024)    [employer] - Occupational Stress Questionnaire     Feeling of Stress : To some extent   Housing Stability: Low Risk  (1/6/2025)    [facility]    Housing Stability Vital Sign     Unable to Pay for Housing in the Last Year: No     Number of Times Moved in the Last Year: 1     Homeless in the Last Year: No   Recent Concern: Housing Stability - High Risk (11/11/2024)    Housing Stability Vital Sign     Unable to Pay for Housing in the Last Year: Yes     Medication List with Changes/Refills   Current Medications    ACCU-CHEK KACI PLUS TEST STRP STRP    USE TO TEST BLOOD SUGAR TID    ACCU-CHEK SOFTCLIX LANCETS MISC    TEST BLOOD SUGAR TID    ALBUTEROL (PROVENTIL/VENTOLIN HFA) 90 MCG/ACTUATION INHALER    INHALE 1 TO 2 PUFFS BY MOUTH FOUR TIMES DAILY    ATORVASTATIN (LIPITOR) 20 MG TABLET    Take 1 tablet (20 mg total) by mouth every evening.    BLOOD-GLUCOSE SENSOR (FREESTYLE ASTER 3 SENSOR) RIANNA    CHANGE SENSOR EVERY 14     DAYS.    BUMETANIDE (BUMEX) 1 MG TABLET    Take 1 mg by mouth daily as  "needed.    CHOLECALCIFEROL, VITAMIN D3, 1,250 MCG (50,000 UNIT) CAPSULE    Take 1 capsule (50,000 Units total) by mouth once a week.    DICLOFENAC SODIUM (VOLTAREN) 1 % GEL    Apply topically 2 (two) times daily.    EMPAGLIFLOZIN (JARDIANCE) 10 MG TABLET    Take 1 tablet (10 mg total) by mouth once daily.    FUROSEMIDE (LASIX) 40 MG TABLET    1 daily as needed per guidelines    HYDROCODONE-ACETAMINOPHEN (NORCO) 5-325 MG PER TABLET    Take 1 tablet by mouth every 6 (six) hours as needed for Pain.    INSULIN LISPRO (HUMALOG KWIKPEN INSULIN) 100 UNIT/ML PEN    Inject 10 Units into the skin 3 (three) times daily.    IVABRADINE (CORLANOR) 5 MG TAB    Take 1 tablet by mouth 2 (two) times daily.    MELOXICAM (MOBIC) 15 MG TABLET    TAKE 1 TABLET(15 MG) BY MOUTH DAILY    METOPROLOL SUCCINATE (TOPROL-XL) 25 MG 24 HR TABLET    Take 1 tablet (25 mg total) by mouth every morning.    MIRTAZAPINE (REMERON) 30 MG TABLET    Take 1 tablet (30 mg total) by mouth every evening.    NEBULIZER AND COMPRESSOR RIANNA    Use every 4-6 hrs prn for wheezing    ONDANSETRON (ZOFRAN-ODT) 8 MG TBDL    Take 1 tablet (8 mg total) by mouth every 12 (twelve) hours as needed (nausea).    PEN NEEDLE, DIABETIC (BD ULTRA-FINE POLO PEN NEEDLE) 32 GAUGE X 5/32" NDLE    USE ONCE DAILY WITH BASAGLAR    PREGABALIN (LYRICA) 100 MG CAPSULE    Take 1 capsule (100 mg total) by mouth 2 (two) times daily.    SACUBITRIL-VALSARTAN (ENTRESTO) 24-26 MG PER TABLET    Take 1 tablet by mouth 2 (two) times daily.    SPIRONOLACTONE (ALDACTONE) 25 MG TABLET    Take 1 tablet (25 mg total) by mouth every morning.    TIRZEPATIDE (MOUNJARO) 10 MG/0.5 ML PNIJ    Inject 10 mg into the skin every 7 days.    TRAMADOL (ULTRAM) 50 MG TABLET    TK 1 T PO  BID     Review of patient's allergies indicates:   Allergen Reactions    Grass pollen-crow grass standard Hives     Review of Systems   Constitutional: Negative for malaise/fatigue.   HENT:  Negative for hearing loss.    Eyes:  Negative " for double vision and visual disturbance.   Cardiovascular:  Negative for chest pain.   Respiratory:  Positive for wheezing. Negative for shortness of breath.    Endocrine: Negative for cold intolerance.   Hematologic/Lymphatic: Does not bruise/bleed easily.   Skin:  Negative for poor wound healing and suspicious lesions.   Musculoskeletal:  Positive for arthritis, joint pain and joint swelling. Negative for gout.   Gastrointestinal:  Positive for constipation and diarrhea. Negative for nausea and vomiting.   Genitourinary:  Negative for dysuria.   Neurological:  Positive for focal weakness, numbness, paresthesias and sensory change.   Psychiatric/Behavioral:  Positive for depression. Negative for memory loss and substance abuse. The patient is nervous/anxious.    Allergic/Immunologic: Negative for persistent infections.       Objective:   Body mass index is 22.55 kg/m².  There were no vitals filed for this visit.             General    Constitutional: She is oriented to person, place, and time. She appears well-developed and well-nourished. No distress.   HENT:   Head: Normocephalic.   Eyes: EOM are normal.   Pulmonary/Chest: Effort normal.   Neurological: She is oriented to person, place, and time.   Psychiatric: She has a normal mood and affect.             Right Hand/Wrist Exam     Inspection   Scars: Wrist - absent Hand -  absent  Effusion: Wrist - absent Hand -  absent    Pain   Wrist - The patient exhibits pain of the flexor/pronator group.    Tests   Phalens sign: positive  Tinel's sign (median nerve): positive  Carpal Tunnel Compression Test: positive    Atrophy   Thenar:  negative  Intrinsic:  negative    Other     Neuorologic Exam    Median Distribution: abnormal  Ulnar Distribution: normal  Radial Distribution: normal    Comments:  The patient has a positive Tinel and positive Phalen sign.  There is no thenar atrophy noted.      Left Hand/Wrist Exam     Inspection   Scars: Wrist - present Hand -   present  Effusion: Wrist - absent Hand -  absent    Other     Sensory Exam  Median Distribution: normal  Ulnar Distribution: normal  Radial Distribution: normal    Comments:  There is a well-healed carpal tunnel incision left wrist.  There are no motor or sensory deficits.          Vascular Exam       Capillary Refill  Right Hand: normal capillary refill  Left Hand: normal capillary refill       radiographs were not obtained today  Assessment:     Encounter Diagnosis   Name Primary?    Carpal tunnel syndrome, bilateral Yes      Status post left carpal tunnel release  Plan:     The patient was injected right carpal tunnel with 1 cc Kenalog and 1 cc 2% plain lidocaine.  She was given a left wrist splint.  She will wait at least 3 months between injections.                Disclaimer: This note was prepared using a voice recognition system and is likely to have sound alike errors within the text.

## 2025-01-14 NOTE — PROCEDURES
Carpal Tunnel: R carpal tunnel    Date/Time: 1/14/2025 9:45 AM    Performed by: Renato Shoemaker MD  Authorized by: Renato Shoemaker MD    Consent Done?:  Yes (Verbal)  Indications:  Pain  Timeout: prior to procedure the correct patient, procedure, and site was verified    Prep: patient was prepped and draped in usual sterile fashion      Local anesthesia used?: Yes    Local anesthetic:  Lidocaine 2% without epinephrine  Anesthetic total (ml):  1    Location:  Wrist  Site:  R carpal tunnel  Ultrasonic Guidance for Needle Placement?: No    Needle size:  25 G  Approach:  Volar  Medications:  40 mg triamcinolone acetonide 40 mg/mL

## 2025-01-17 ENCOUNTER — OFFICE VISIT (OUTPATIENT)
Dept: PRIMARY CARE CLINIC | Facility: CLINIC | Age: 60
End: 2025-01-17
Payer: COMMERCIAL

## 2025-01-17 VITALS
OXYGEN SATURATION: 99 % | TEMPERATURE: 96 F | DIASTOLIC BLOOD PRESSURE: 68 MMHG | BODY MASS INDEX: 20 KG/M2 | WEIGHT: 116.5 LBS | SYSTOLIC BLOOD PRESSURE: 118 MMHG | HEART RATE: 83 BPM

## 2025-01-17 DIAGNOSIS — Z79.4 TYPE 2 DIABETES MELLITUS WITH DIABETIC POLYNEUROPATHY, WITH LONG-TERM CURRENT USE OF INSULIN: ICD-10-CM

## 2025-01-17 DIAGNOSIS — F33.2 MAJOR DEPRESSIVE DISORDER, RECURRENT SEVERE WITHOUT PSYCHOTIC FEATURES: ICD-10-CM

## 2025-01-17 DIAGNOSIS — E11.42 TYPE 2 DIABETES MELLITUS WITH DIABETIC POLYNEUROPATHY, WITH LONG-TERM CURRENT USE OF INSULIN: ICD-10-CM

## 2025-01-17 DIAGNOSIS — I50.33 ACUTE ON CHRONIC HEART FAILURE WITH PRESERVED EJECTION FRACTION: ICD-10-CM

## 2025-01-17 DIAGNOSIS — Z09 HOSPITAL DISCHARGE FOLLOW-UP: Primary | ICD-10-CM

## 2025-01-17 DIAGNOSIS — I50.43: Primary | ICD-10-CM

## 2025-01-17 PROCEDURE — G2211 COMPLEX E/M VISIT ADD ON: HCPCS | Mod: S$GLB,,, | Performed by: INTERNAL MEDICINE

## 2025-01-17 PROCEDURE — 3074F SYST BP LT 130 MM HG: CPT | Mod: CPTII,S$GLB,, | Performed by: INTERNAL MEDICINE

## 2025-01-17 PROCEDURE — 1159F MED LIST DOCD IN RCRD: CPT | Mod: CPTII,S$GLB,, | Performed by: INTERNAL MEDICINE

## 2025-01-17 PROCEDURE — 3078F DIAST BP <80 MM HG: CPT | Mod: CPTII,S$GLB,, | Performed by: INTERNAL MEDICINE

## 2025-01-17 PROCEDURE — 99214 OFFICE O/P EST MOD 30 MIN: CPT | Mod: S$GLB,,, | Performed by: INTERNAL MEDICINE

## 2025-01-17 PROCEDURE — 99999 PR PBB SHADOW E&M-EST. PATIENT-LVL V: CPT | Mod: PBBFAC,,, | Performed by: INTERNAL MEDICINE

## 2025-01-17 PROCEDURE — 1160F RVW MEDS BY RX/DR IN RCRD: CPT | Mod: CPTII,S$GLB,, | Performed by: INTERNAL MEDICINE

## 2025-01-17 RX ORDER — ALBUTEROL SULFATE 90 UG/1
2 INHALANT RESPIRATORY (INHALATION) 4 TIMES DAILY PRN
COMMUNITY
Start: 2024-11-15

## 2025-01-17 RX ORDER — ATORVASTATIN CALCIUM 40 MG/1
40 TABLET, FILM COATED ORAL DAILY
COMMUNITY

## 2025-01-17 RX ORDER — GABAPENTIN 600 MG/1
600 TABLET ORAL 3 TIMES DAILY PRN
COMMUNITY

## 2025-01-17 RX ORDER — GLIPIZIDE 2.5 MG/1
2.5 TABLET, EXTENDED RELEASE ORAL
COMMUNITY

## 2025-01-17 NOTE — PROGRESS NOTES
Subjective     Patient ID: Roopa Hicks is a 59 y.o. female.    Chief Complaint: Follow-up (Patient is here for a hospital follow up.)      HPI  here for hosp f/u.  5 day stay at [facility] is Dr. Yi. 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  We discussed all in detail, including that she needed to get/accept help in order for her to focus on her health.  Has all meds.      Josiah Yi Jr., MD - 01/09/2025 11:35 AM CST  Formatting of this note is different from the original.  Images from the original note were not included.    ADMISSION INFORMATION    Admit Date: 1/6/2025 Primary Care Physician: Camille Pablo MD  Admitting Physician: Josiah Yi Jr., MD Primary Care Phone: None  Consulting Provider(s):    DISCHARGE INFORMATION    Discharge Date: 1/9/2025 Primary Discharge Diagnosis: <principal problem not specified>  Discharge Physician: Josiah Yi Jr., MD Secondary Discharge Diagnosis: Active Problems:  Acute heart failure with reduced ejection fraction and diastolic dysfunction (HCC) (POA: Unknown)  Resolved Problems:  * No resolved hospital problems. *      Discharge Condition: stable    Discharge Disposition: Home - Self Care    DETAILS OF HOSPITAL STAY    Presenting Problem: Active Problems:  Acute heart failure with reduced ejection fraction and diastolic dysfunction (HCC)      Hospital Course:  Patient 59-year-old lady who was admitted to the hospital on 1/6/2025 after she presented to clinic with marked volume overload. She received IV diuretics the first night and diuresed over 7 L. She was given a single dose of IV Bumex on 1/7/2025 and became hypotensive. Since that time Bumex has been held and we did give her some volume back. Blood pressure has improved. Patient is complaining of dizziness but this is a chronic issue. From a heart failure standpoint she reports this is the best she has felt in a  long time. Patient is stable for discharge. Will hold off on GDMT except for Jardiance for now given her soft blood pressures. She can start back on Bumex tomorrow morning. Will have her follow-up in clinic early next week. Will plan to have patient undergo workup for amyloidosis. Will also get a repeat chemistry when she follows up.    Significant Diagnostic Studies/Procedures:  Complications: None    DISCHARGE PLAN    Activity Instructions    Normal activity as tolerated        Diet Instructions    Discharge Diet  Patient Type: Adult - Diet  Diet-Adult Home Diet Type: Cardiac          Medication List      CONTINUE taking these medications    ACCU-CHEK KACI PLUS TEST STRIP  Generic drug: blood sugar diagnostic    * albuterol 2.5 mg /3 mL (0.083 %) nebulizer solution    * albuterol HFA 90 mcg/actuation inhaler  INHALE 1 TO 2 PUFFS BY MOUTH FOUR TIMES DAILY    atorvastatin 40 mg tablet  Commonly known as: LIPITOR  Take 1 tablet by mouth at bedtime.    bumetanide 1 mg tablet  Commonly known as: BUMEX  Take 1 tablet by mouth in the morning and 1 tablet before bedtime. Do all this for 180 days.    cholecalciferol (vitamin D3) 1,250 mcg (50,000 unit) capsule    empagliflozin 10 mg Tab  Commonly known as: Jardiance  Take 10 mg by mouth in the morning for 180 days.    * FreeStyle Tono 14 Day [place]  Generic drug: flash glucose scanning reader    * flash glucose scanning reader    * FreeStyle Tono 14 Day Sensor Kit  Generic drug: flash glucose sensor    * flash glucose sensor Kit    gabapentin 600 MG tablet  Commonly known as: NEURONTIN    glipiZIDE 2.5 mg 24 hr tablet  Commonly known as: GLUCOTROL    HumaLOG KwikPen Insulin 100 unit/mL Inpn  Generic drug: insulin lispro    lancets Misc    mirtazapine 15 mg tablet  Commonly known as: REMERON    Mounjaro 10 mg/0.5 mL Pnij  Generic drug: tirzepatide    mupirocin 2 % ointment  Commonly known as: BACTROBAN  Apply topically 2 (two) times daily.    ondansetron ODT 8 mg  disintegrating tablet  Commonly known as: ZOFRAN-ODT    pregabalin 75 mg capsule  Commonly known as: LYRICA    Vios Aerosol Delivery System Tati  Generic drug: nebulizer and compressor        * This list has 6 medication(s) that are the same as other medications prescribed for you. Read the directions carefully, and ask your doctor or other care provider to review them with you.            STOP taking these medications    Entresto 24-26 mg tablet  Generic drug: sacubitriL-valsartan    metOLazone 5 mg tablet  Commonly known as: ZAROXOLYN    metoprolol succinate XL 25 mg 24 hr tablet  Commonly known as: TOPROL-XL          Other Instructions    Call MD for Worsening Symptoms  Call MD for: difficulty breathing, headache or visual disturbances  Call MD for: extreme fatigue  Call MD for: hives  Call MD for: persistent dizziness or light-headedness  Call MD for: persistent nausea and vomiting  Call MD for: redness, tenderness, or signs of infection (pain, swelling, redness, odor or green/yellow discharge around incision site)  Call MD for: severe uncontrolled pain  Call MD for: temperature >100.4  Discharge Condition: Improving  Follow-up with cardiologist in 1-2 weeks  Follow-up in: 5 Days  Lifting restrictions  Weight restriction of 5 lbs with arm used for the procedure  Notify your physician if you experience weight gain of more than 3 pounds over your discharge weight in 2 days or 5 pounds over your discharge weight in one week.  Provide Cardiology patient education materials  Please provide discharge instructions for Congestive Heart Failure  Remove dressing in 24 hours        Past Medical History:   Diagnosis Date    Anxiety     Arthritis     Carpal tunnel syndrome, bilateral     Depression     Diabetic peripheral neuropathy associated with type 2 diabetes mellitus     Essential (primary) hypertension     Gastroparesis due to secondary diabetes     Mild intermittent asthma, uncomplicated     Osteopenia     Renal  impairment     Shoulder pain, right     Type 2 diabetes mellitus without complications      Review of patient's allergies indicates:   Allergen Reactions    Grass pollen- grass standard Hives     Past Surgical History:   Procedure Laterality Date    CARPAL TUNNEL RELEASE Left 2024    Procedure: RELEASE, CARPAL TUNNEL;  Surgeon: Renato Shoemaker MD;  Location: [facility];  Service: Orthopedics;  Laterality: Left;  left carpal tunnel release     SECTION      HYSTERECTOMY      INSERTION OF PACEMAKER Bilateral 2021    NEPHRECTOMY Left      Family History   Problem Relation Name Age of Onset    Diabetes Mother      Hypertension Mother      Heart disease Mother      Diabetes Father      Hypertension Father      Heart disease Father      Cancer Maternal Grandmother       Social History     Socioeconomic History    Marital status:    Tobacco Use    Smoking status: Never     Passive exposure: Never    Smokeless tobacco: Never   Substance and Sexual Activity    Alcohol use: Never    Drug use: Never    Sexual activity: Not Currently     Social Drivers of Health     Financial Resource Strain: Medium Risk (2024)    Overall Financial Resource Strain (CARDIA)     Difficulty of Paying Living Expenses: Somewhat hard   Food Insecurity: No Food Insecurity (2025)    Received from [facility] and Its Subsidiaries and Affiliates    Hunger Vital Sign     Worried About Running Out of Food in the Last Year: Never true     Ran Out of Food in the Last Year: Never true   Recent Concern: Food Insecurity - Food Insecurity Present (2024)    Hunger Vital Sign     Worried About Running Out of Food in the Last Year: Sometimes true     Ran Out of Food in the Last Year: Sometimes true   Transportation Needs: No Transportation Needs (2025)    Received from [facility] and Its Subsidiaries and Affiliates    PRAMANDI -  Transportation     Lack of Transportation (Medical): No     Lack of Transportation (Non-Medical): No   Physical Activity: Unknown (11/11/2024)    Exercise Vital Sign     Days of Exercise per Week: 3 days   Stress: Stress Concern Present (11/11/2024)    [place] citizen [place] of Occupational [employer] - Occupational Stress Questionnaire     Feeling of Stress : To some extent   Housing Stability: Low Risk  (1/6/2025)    Received from [facility] and Its Subsidiaries and Affiliates    Housing Stability Vital Sign     Unable to Pay for Housing in the Last Year: No     Number of Times Moved in the Last Year: 1     Homeless in the Last Year: No   Recent Concern: Housing Stability - High Risk (11/11/2024)    Housing Stability Vital Sign     Unable to Pay for Housing in the Last Year: Yes         /68 (BP Location: Right arm)   Pulse 83   Temp 96.4 °F (35.8 °C) (Tympanic)   Wt 52.9 kg (116 lb 8.2 oz)   SpO2 99%   BMI 20.00 kg/m²   Outpatient Medications as of 1/17/2025   Medication Sig Dispense Refill    ACCU-CHEK KACI PLUS TEST STRP Strp USE TO TEST BLOOD SUGAR  each 1    ACCU-CHEK SOFTCLIX LANCETS Misc TEST BLOOD SUGAR  each 1    albuterol (PROVENTIL/VENTOLIN HFA) 90 mcg/actuation inhaler Inhale 2 puffs into the lungs 4 (four) times daily as needed.      blood-glucose sensor (FREESTYLE ASTER 3 SENSOR) Tati CHANGE SENSOR EVERY 14     DAYS. 6 each 3    cholecalciferol, vitamin D3, 1,250 mcg (50,000 unit) capsule Take 1 capsule (50,000 Units total) by mouth once a week. 12 capsule 1    diclofenac sodium (VOLTAREN) 1 % Gel Apply topically 2 (two) times daily. 300 g 1    empagliflozin (JARDIANCE) 10 mg tablet Take 1 tablet (10 mg total) by mouth once daily. 90 tablet 3    HYDROcodone-acetaminophen (NORCO) 5-325 mg per tablet Take 1 tablet by mouth every 6 (six) hours as needed for Pain. 15 tablet 0    insulin lispro (HUMALOG KWIKPEN INSULIN) 100 unit/mL pen Inject 10 Units  "into the skin 3 (three) times daily. 27 mL 3    mirtazapine (REMERON) 30 MG tablet Take 1 tablet (30 mg total) by mouth every evening. 90 tablet 3    nebulizer and compressor Tati Use every 4-6 hrs prn for wheezing 1 each 0    ondansetron (ZOFRAN-ODT) 8 MG TbDL Take 1 tablet (8 mg total) by mouth every 12 (twelve) hours as needed (nausea). 30 tablet 11    pen needle, diabetic (BD ULTRA-FINE POLO PEN NEEDLE) 32 gauge x 5/32" Ndle USE ONCE DAILY WITH BASAGLAR 300 each 1    pregabalin (LYRICA) 100 MG capsule Take 1 capsule (100 mg total) by mouth 2 (two) times daily. 180 capsule 1    tirzepatide (MOUNJARO) 10 mg/0.5 mL PnIj Inject 10 mg into the skin every 7 days. 24 Pen 1    bumetanide (BUMEX) 1 MG tablet Take 1 mg by mouth daily as needed. (Patient not taking: Reported on 1/17/2025)      furosemide (LASIX) 40 MG tablet 1 daily as needed per guidelines (Patient not taking: Reported on 1/17/2025) 90 tablet 1    ivabradine (CORLANOR) 5 mg Tab Take 1 tablet by mouth 2 (two) times daily. (Patient not taking: Reported on 1/17/2025)      metoprolol succinate (TOPROL-XL) 25 MG 24 hr tablet Take 1 tablet (25 mg total) by mouth every morning. (Patient not taking: Reported on 1/17/2025) 90 tablet 3    sacubitriL-valsartan (ENTRESTO) 24-26 mg per tablet Take 1 tablet by mouth 2 (two) times daily. (Patient not taking: Reported on 1/17/2025) 180 tablet 3    spironolactone (ALDACTONE) 25 MG tablet Take 1 tablet (25 mg total) by mouth every morning. 90 tablet 3    traMADol (ULTRAM) 50 mg tablet TK 1 T PO  BID (Patient taking differently: Take 50 mg by mouth once daily.) 45 tablet 1     No current facility-administered medications on file as of 1/17/2025.       Review of Systems   All other systems reviewed and are negative.         Objective     Physical Exam  Constitutional:       General: She is not in acute distress.     Appearance: Normal appearance. She is not ill-appearing, toxic-appearing or diaphoretic.   HENT:      Head: " Normocephalic and atraumatic.      Mouth/Throat:      Mouth: Mucous membranes are moist.   Eyes:      Conjunctiva/sclera: Conjunctivae normal.   Cardiovascular:      Rate and Rhythm: Normal rate and regular rhythm.      Heart sounds: Murmur heard.      No friction rub. No gallop.   Pulmonary:      Effort: Pulmonary effort is normal. No respiratory distress.      Breath sounds: Normal breath sounds. No wheezing or rales.   Musculoskeletal:      Cervical back: Neck supple.   Skin:     General: Skin is dry.   Neurological:      General: No focal deficit present.      Mental Status: She is alert and oriented to person, place, and time.   Psychiatric:         Mood and Affect: Mood normal.         Behavior: Behavior normal.            Assessment and Plan     1. Hospital discharge follow-up  -     Ambulatory referral/consult to Cardiac Rehab; Future; Expected date: 01/24/2025    2. Acute on chronic heart failure with preserved ejection fraction  -     Ambulatory referral/consult to Cardiac Rehab; Future; Expected date: 01/24/2025  -     Ambulatory referral/consult to Home Health; Future; Expected date: 01/18/2025  -     Diabetes Digital Medicine (DDMP) Enrollment Order  -     Hypertension Digital Medicine (HDMP) Enrollment Order    3. Major depressive disorder, recurrent severe without psychotic features  Comments:  d/w pt in detail.  Dr. Karen patrick MD; plans to reestablish with mental health care and counselor  Orders:  -     Ambulatory referral/consult to Home Health; Future; Expected date: 01/18/2025  -     Diabetes Digital Medicine (DDMP) Enrollment Order  -     Hypertension Digital Medicine (HDMP) Enrollment Order    4. Type 2 diabetes mellitus with diabetic polyneuropathy, with long-term current use of insulin  Overview:  continue current meds on lisinopril 10 mg a day    Orders:  -     Diabetes Digital Medicine (DDMP) Enrollment Order  -     Hypertension Digital Medicine (HDMP) Enrollment Order          Explained to patient that stimulants are generally not a great idea with her heart hx  She has a lot going on.  Encouraged her to allow others to help her and her elderly parents.      F/u with Dr. Yi as scheduled.    Daily weights and bp.    Immunization History   Administered Date(s) Administered    COVID-19, MRNA, LN-S, PF (MODERNA FULL 0.5 ML DOSE) 02/27/2021, 02/27/2021, 02/27/2021, 03/29/2021, 03/29/2021, 08/17/2021, 08/17/2021, 07/09/2022    COVID-19, mRNA, LNP-S, PF (Moderna) Ages 12+ 01/23/2024    Influenza - Quadrivalent 10/09/2020    Influenza - Quadrivalent - PF *Preferred* (6 months and older) 10/23/2019, 10/09/2020, 11/30/2021, 10/07/2022, 10/06/2023    Pneumococcal Polysaccharide - 23 Valent 05/05/2023    Td (ADULT) 05/19/2005    Tdap 05/19/2005, 05/19/2005, 07/10/2020     Visit today included increased complexity associated with the care of the episodic problem anxiety/stress addressed and managing the longitudinal care of the patient due to the serious and/or complex managed problem(s) acute on chronic heart failure.

## 2025-01-23 RX ORDER — MUPIROCIN 20 MG/G
OINTMENT TOPICAL 3 TIMES DAILY
Qty: 30 G | Refills: 2 | Status: SHIPPED | OUTPATIENT
Start: 2025-01-23 | End: 2025-01-30 | Stop reason: SDUPTHER

## 2025-01-30 ENCOUNTER — OFFICE VISIT (OUTPATIENT)
Dept: PRIMARY CARE CLINIC | Facility: CLINIC | Age: 60
End: 2025-01-30
Payer: COMMERCIAL

## 2025-01-30 VITALS
HEART RATE: 98 BPM | SYSTOLIC BLOOD PRESSURE: 122 MMHG | WEIGHT: 114 LBS | DIASTOLIC BLOOD PRESSURE: 68 MMHG | OXYGEN SATURATION: 97 % | BODY MASS INDEX: 19.56 KG/M2 | TEMPERATURE: 97 F

## 2025-01-30 DIAGNOSIS — E44.0 MODERATE PROTEIN-CALORIE MALNUTRITION: ICD-10-CM

## 2025-01-30 DIAGNOSIS — L89.90 PRESSURE INJURY OF SKIN, UNSPECIFIED INJURY STAGE, UNSPECIFIED LOCATION: Primary | ICD-10-CM

## 2025-01-30 PROCEDURE — 3078F DIAST BP <80 MM HG: CPT | Mod: CPTII,S$GLB,, | Performed by: INTERNAL MEDICINE

## 2025-01-30 PROCEDURE — G2211 COMPLEX E/M VISIT ADD ON: HCPCS | Mod: S$GLB,,, | Performed by: INTERNAL MEDICINE

## 2025-01-30 PROCEDURE — 3008F BODY MASS INDEX DOCD: CPT | Mod: CPTII,S$GLB,, | Performed by: INTERNAL MEDICINE

## 2025-01-30 PROCEDURE — 99213 OFFICE O/P EST LOW 20 MIN: CPT | Mod: S$GLB,,, | Performed by: INTERNAL MEDICINE

## 2025-01-30 PROCEDURE — 99999 PR PBB SHADOW E&M-EST. PATIENT-LVL V: CPT | Mod: PBBFAC,,, | Performed by: INTERNAL MEDICINE

## 2025-01-30 PROCEDURE — 3074F SYST BP LT 130 MM HG: CPT | Mod: CPTII,S$GLB,, | Performed by: INTERNAL MEDICINE

## 2025-01-30 PROCEDURE — 1159F MED LIST DOCD IN RCRD: CPT | Mod: CPTII,S$GLB,, | Performed by: INTERNAL MEDICINE

## 2025-01-30 RX ORDER — MUPIROCIN 20 MG/G
OINTMENT TOPICAL 3 TIMES DAILY
Qty: 60 G | Refills: 2 | Status: SHIPPED | OUTPATIENT
Start: 2025-01-30

## 2025-01-30 NOTE — PROGRESS NOTES
Subjective     Patient ID: Roopa Hicks is a 59 y.o. female.    Chief Complaint: Pressure Ulcer (Patient has a pressure ulcer on her buttocks)      HPI  gluteal cleft.  Uncomfortable with long periods of sitting.  No drainage.  No fever or jeremy pain.  Cleansing regularly.      Past Medical History:   Diagnosis Date    Anxiety     Arthritis     Carpal tunnel syndrome, bilateral     Depression     Diabetic peripheral neuropathy associated with type 2 diabetes mellitus     Essential (primary) hypertension     Gastroparesis due to secondary diabetes     Mild intermittent asthma, uncomplicated     Osteopenia     Renal impairment     Shoulder pain, right     Type 2 diabetes mellitus without complications      Review of patient's allergies indicates:   Allergen Reactions    Grass pollen- grass standard Hives     Past Surgical History:   Procedure Laterality Date    CARPAL TUNNEL RELEASE Left 2024    Procedure: RELEASE, CARPAL TUNNEL;  Surgeon: Renato Shoemaker MD;  Location: [facility];  Service: Orthopedics;  Laterality: Left;  left carpal tunnel release     SECTION      HYSTERECTOMY      INSERTION OF PACEMAKER Bilateral 2021    NEPHRECTOMY Left      Family History   Problem Relation Name Age of Onset    Diabetes Mother      Hypertension Mother      Heart disease Mother      Diabetes Father      Hypertension Father      Heart disease Father      Cancer Maternal Grandmother       Social History     Socioeconomic History    Marital status:    Tobacco Use    Smoking status: Never     Passive exposure: Never    Smokeless tobacco: Never   Substance and Sexual Activity    Alcohol use: Never    Drug use: Never    Sexual activity: Not Currently     Social Drivers of Health     Financial Resource Strain: Medium Risk (2024)    Overall Financial Resource Strain (CARDIA)     Difficulty of Paying Living Expenses: Somewhat hard   Food Insecurity: No Food Insecurity (2025)    Received  from [facility]    Hunger Vital Sign     Worried About Running Out of Food in the Last Year: Never true     Ran Out of Food in the Last Year: Never true   Recent Concern: Food Insecurity - Food Insecurity Present (11/11/2024)    Hunger Vital Sign     Worried About Running Out of Food in the Last Year: Sometimes true     Ran Out of Food in the Last Year: Sometimes true   Transportation Needs: No Transportation Needs (1/6/2025)    Received from [facility]    PRAPARE - Transportation     Lack of Transportation (Medical): No     Lack of Transportation (Non-Medical): No   Physical Activity: Unknown (11/11/2024)    Exercise Vital Sign     Days of Exercise per Week: 3 days   Stress: Stress Concern Present (11/11/2024)    [place] of Occupational Health - Occupational Stress Questionnaire     Feeling of Stress : To some extent   Housing Stability: Low Risk  (1/6/2025)    Received from [facility]    Housing Stability Vital Sign     Unable to Pay for Housing in the Last Year: No     Number of Times Moved in the Last Year: 1     Homeless in the Last Year: No   Recent Concern: Housing Stability - High Risk (11/11/2024)    Housing Stability Vital Sign     Unable to Pay for Housing in the Last Year: Yes         /68 (BP Location: Right arm)   Pulse 98   Temp 96.8 °F (36 °C) (Tympanic)   Wt 51.7 kg (113 lb 15.7 oz)   SpO2 97%   BMI 19.56 kg/m²   Outpatient Medications as of 1/30/2025   Medication Sig Dispense Refill    ACCU-CHEK KACI PLUS TEST STRP Strp USE TO TEST BLOOD SUGAR  each 1    ACCU-CHEK SOFTCLIX LANCETS Misc TEST BLOOD SUGAR  each 1    albuterol (PROVENTIL/VENTOLIN HFA) 90 mcg/actuation inhaler Inhale 2 puffs into the lungs 4 (four) times daily as needed.       "atorvastatin (LIPITOR) 40 MG tablet Take 40 mg by mouth once daily.      blood-glucose sensor (FREESTYLE ASTER 3 SENSOR) Tati CHANGE SENSOR EVERY 14     DAYS. 6 each 3    bumetanide (BUMEX) 1 MG tablet Take 1 mg by mouth daily as needed.      cholecalciferol, vitamin D3, 1,250 mcg (50,000 unit) capsule Take 1 capsule (50,000 Units total) by mouth once a week. 12 capsule 1    diclofenac sodium (VOLTAREN) 1 % Gel Apply topically 2 (two) times daily. 300 g 1    empagliflozin (JARDIANCE) 10 mg tablet Take 1 tablet (10 mg total) by mouth once daily. 90 tablet 3    gabapentin (NEURONTIN) 600 MG tablet Take 600 mg by mouth 3 (three) times daily as needed.      glipiZIDE (GLUCOTROL) 2.5 MG TR24 Take 2.5 mg by mouth daily with breakfast.      HYDROcodone-acetaminophen (NORCO) 5-325 mg per tablet Take 1 tablet by mouth every 6 (six) hours as needed for Pain. 15 tablet 0    insulin lispro (HUMALOG KWIKPEN INSULIN) 100 unit/mL pen Inject 10 Units into the skin 3 (three) times daily. 27 mL 3    mirtazapine (REMERON) 30 MG tablet Take 1 tablet (30 mg total) by mouth every evening. 90 tablet 3    nebulizer and compressor Tati Use every 4-6 hrs prn for wheezing 1 each 0    ondansetron (ZOFRAN-ODT) 8 MG TbDL Take 1 tablet (8 mg total) by mouth every 12 (twelve) hours as needed (nausea). 30 tablet 11    pen needle, diabetic (BD ULTRA-FINE POLO PEN NEEDLE) 32 gauge x 5/32" Ndle USE ONCE DAILY WITH BASAGLAR 300 each 1    pregabalin (LYRICA) 100 MG capsule Take 1 capsule (100 mg total) by mouth 2 (two) times daily. 180 capsule 1    spironolactone (ALDACTONE) 25 MG tablet Take 1 tablet (25 mg total) by mouth every morning. 90 tablet 3    tirzepatide (MOUNJARO) 10 mg/0.5 mL PnIj Inject 10 mg into the skin every 7 days. 24 Pen 1    traMADol (ULTRAM) 50 mg tablet TK 1 T PO  BID (Patient taking differently: Take 50 mg by mouth once daily.) 45 tablet 1    furosemide (LASIX) 40 MG tablet 1 daily as needed per guidelines (Patient not taking: " Reported on 1/30/2025) 90 tablet 1    ivabradine (CORLANOR) 5 mg Tab Take 1 tablet by mouth 2 (two) times daily. (Patient not taking: Reported on 1/30/2025)      metoprolol succinate (TOPROL-XL) 25 MG 24 hr tablet Take 1 tablet (25 mg total) by mouth every morning. 90 tablet 3    sacubitriL-valsartan (ENTRESTO) 24-26 mg per tablet Take 1 tablet by mouth 2 (two) times daily. (Patient not taking: Reported on 1/30/2025) 180 tablet 3     No current facility-administered medications on file as of 1/30/2025.       Review of Systems   All other systems reviewed and are negative.         Objective     Physical Exam  Constitutional:       Appearance: Normal appearance.      Comments: Thin  Decreased sub q tissue extremities  Decreased muscle mass   HENT:      Head: Normocephalic and atraumatic.   Cardiovascular:      Rate and Rhythm: Normal rate.   Pulmonary:      Effort: Pulmonary effort is normal. No respiratory distress.   Musculoskeletal:      Cervical back: Neck supple.   Skin:     Comments: Gluteal cleft with small ulceration of skin w/o induration or fluctuance or drainage   Neurological:      Mental Status: She is alert and oriented to person, place, and time.   Psychiatric:         Mood and Affect: Mood normal.         Behavior: Behavior normal.            Assessment and Plan     1. Pressure injury of skin, unspecified injury stage, unspecified location  -     mupirocin (BACTROBAN) 2 % ointment; Apply topically 3 (three) times daily.  Dispense: 60 g; Refill: 2  -     Ambulatory referral/consult to Wound Clinic; Future; Expected date: 02/06/2025    2. Moderate protein-calorie malnutrition     Working on [facility]        Immunization History   Administered Date(s) Administered    COVID-19, MRNA, LN-S, PF (MODERNA FULL 0.5 ML DOSE) 02/27/2021, 02/27/2021, 02/27/2021, 03/29/2021, 03/29/2021, 08/17/2021, 08/17/2021, 07/09/2022    COVID-19, mRNA, LNP-S, PF (Moderna) Ages 12+ 01/23/2024    Influenza - Quadrivalent 10/09/2020     Influenza - Quadrivalent - PF *Preferred* (6 months and older) 10/23/2019, 10/09/2020, 11/30/2021, 10/07/2022, 10/06/2023    Pneumococcal Polysaccharide - 23 Valent 05/05/2023    Td (ADULT) 05/19/2005    Tdap 05/19/2005, 05/19/2005, 07/10/2020       Visit today included increased complexity associated with the care of the episodic problem pressure ulcer addressed and managing the longitudinal care of the patient due to the serious and/or complex managed problem(s) protein calorie deficiet.

## 2025-02-06 ENCOUNTER — TELEPHONE (OUTPATIENT)
Dept: CARDIAC REHAB | Facility: HOSPITAL | Age: 60
End: 2025-02-06
Payer: COMMERCIAL

## 2025-02-06 NOTE — TELEPHONE ENCOUNTER
"Called pt re: cardiac rehab referral. Referral reviewed and wait list here at [facility] reviewed. Pt states she wants "best option" between cardiac rehab services through [facility] or [facility]. Cardiology following pt through [facility] with recent treatment detailed by pt. Pt agreeable to attend [facility]  cardiac rehab if available. Will reach out to [facility] rehab to see if they can accept patient sooner.  "

## 2025-02-28 ENCOUNTER — TELEPHONE (OUTPATIENT)
Dept: PREADMISSION TESTING | Facility: HOSPITAL | Age: 60
End: 2025-02-28
Payer: COMMERCIAL

## 2025-02-28 NOTE — LETTER
Cardiac Clearance Request    Roopa Hicks  1965  [id number]    Request Sent to: Dr. Josiah Yi  Ordering Physician: [facility]    We will be scheduling your patient for a(n) colonoscopy using MAC anesthesia.    Please choose one of the following:  [] Patient is at low/moderate risk for procedure/anesthesia from cardiac standpoint.   [] Patient is at increased risk but not prohibited risk from cardiac standpoint. To minimize risk, we recommend the following:   ___________________________________________________________________  ___________________________________________________________________  [] Patient is at prohibited risk from cardiac standpoint for above procedure. Reason/recommendation:   ______________________________________________________________________   ______________________________________________________________________  [] Patient needs to schedule a clinic appointment for pre op evaluation       ______________________________________________________________________                                          (Physician Signature)                                            (Date)      We will also need to following information prior to scheduling an appointment:   patient demographics, insurance coverage, recent office visit note, current medication(s) and known allergies, recent labs and recent cardiac procedure results.     Send completed form with required documents to  [facility]   Endoscopy Scheduling Department  Email to [email or web address] or Fax to [phone]  If you have any questions, please contact scheduling dept at [phone]

## 2025-03-12 ENCOUNTER — LAB VISIT (OUTPATIENT)
Dept: LAB | Facility: HOSPITAL | Age: 60
End: 2025-03-12
Attending: INTERNAL MEDICINE
Payer: COMMERCIAL

## 2025-03-12 ENCOUNTER — OFFICE VISIT (OUTPATIENT)
Dept: PRIMARY CARE CLINIC | Facility: CLINIC | Age: 60
End: 2025-03-12
Payer: COMMERCIAL

## 2025-03-12 VITALS
WEIGHT: 115.63 LBS | SYSTOLIC BLOOD PRESSURE: 122 MMHG | DIASTOLIC BLOOD PRESSURE: 68 MMHG | HEART RATE: 71 BPM | BODY MASS INDEX: 19.85 KG/M2 | TEMPERATURE: 97 F | OXYGEN SATURATION: 99 %

## 2025-03-12 DIAGNOSIS — H10.13 ALLERGIC CONJUNCTIVITIS OF BOTH EYES: ICD-10-CM

## 2025-03-12 DIAGNOSIS — Z79.4 DIABETES MELLITUS DUE TO UNDERLYING CONDITION WITH HYPERGLYCEMIA, WITH LONG-TERM CURRENT USE OF INSULIN: ICD-10-CM

## 2025-03-12 DIAGNOSIS — E08.65 DIABETES MELLITUS DUE TO UNDERLYING CONDITION WITH HYPERGLYCEMIA, WITH LONG-TERM CURRENT USE OF INSULIN: ICD-10-CM

## 2025-03-12 DIAGNOSIS — Z63.79 STRESS DUE TO ILLNESS OF FAMILY MEMBER: ICD-10-CM

## 2025-03-12 DIAGNOSIS — Z09 FOLLOW-UP EXAM: Primary | ICD-10-CM

## 2025-03-12 DIAGNOSIS — F33.2 MAJOR DEPRESSIVE DISORDER, RECURRENT SEVERE WITHOUT PSYCHOTIC FEATURES: ICD-10-CM

## 2025-03-12 DIAGNOSIS — K59.01 SLOW TRANSIT CONSTIPATION: ICD-10-CM

## 2025-03-12 LAB
ANION GAP SERPL CALC-SCNC: 12 MMOL/L (ref 8–16)
BASOPHILS # BLD AUTO: 0.02 K/UL (ref 0–0.2)
BASOPHILS NFR BLD: 0.3 % (ref 0–1.9)
BUN SERPL-MCNC: 21 MG/DL (ref 8–23)
CALCIUM SERPL-MCNC: 9.7 MG/DL (ref 8.7–10.5)
CHLORIDE SERPL-SCNC: 109 MMOL/L (ref 95–110)
CO2 SERPL-SCNC: 24 MMOL/L (ref 23–29)
CREAT SERPL-MCNC: 1 MG/DL (ref 0.5–1.4)
DIFFERENTIAL METHOD BLD: ABNORMAL
EOSINOPHIL # BLD AUTO: 0 K/UL (ref 0–0.5)
EOSINOPHIL NFR BLD: 0.7 % (ref 0–8)
ERYTHROCYTE [DISTWIDTH] IN BLOOD BY AUTOMATED COUNT: 15.1 % (ref 11.5–14.5)
EST. GFR  (NO RACE VARIABLE): >60 ML/MIN/1.73 M^2
ESTIMATED AVG GLUCOSE: 160 MG/DL (ref 68–131)
GLUCOSE SERPL-MCNC: 177 MG/DL (ref 70–110)
HBA1C MFR BLD: 7.2 % (ref 4–5.6)
HCT VFR BLD AUTO: 42.8 % (ref 37–48.5)
HGB BLD-MCNC: 13.3 G/DL (ref 12–16)
IMM GRANULOCYTES # BLD AUTO: 0.01 K/UL (ref 0–0.04)
IMM GRANULOCYTES NFR BLD AUTO: 0.2 % (ref 0–0.5)
LYMPHOCYTES # BLD AUTO: 2.1 K/UL (ref 1–4.8)
LYMPHOCYTES NFR BLD: 35.4 % (ref 18–48)
MCH RBC QN AUTO: 27.5 PG (ref 27–31)
MCHC RBC AUTO-ENTMCNC: 31.1 G/DL (ref 32–36)
MCV RBC AUTO: 88 FL (ref 82–98)
MONOCYTES # BLD AUTO: 0.4 K/UL (ref 0.3–1)
MONOCYTES NFR BLD: 6.9 % (ref 4–15)
NEUTROPHILS # BLD AUTO: 3.4 K/UL (ref 1.8–7.7)
NEUTROPHILS NFR BLD: 56.5 % (ref 38–73)
NRBC BLD-RTO: 0 /100 WBC
PLATELET # BLD AUTO: 235 K/UL (ref 150–450)
PMV BLD AUTO: 10.5 FL (ref 9.2–12.9)
POTASSIUM SERPL-SCNC: 4.2 MMOL/L (ref 3.5–5.1)
RBC # BLD AUTO: 4.84 M/UL (ref 4–5.4)
SODIUM SERPL-SCNC: 145 MMOL/L (ref 136–145)
WBC # BLD AUTO: 5.96 K/UL (ref 3.9–12.7)

## 2025-03-12 PROCEDURE — 80048 BASIC METABOLIC PNL TOTAL CA: CPT | Performed by: INTERNAL MEDICINE

## 2025-03-12 PROCEDURE — 83036 HEMOGLOBIN GLYCOSYLATED A1C: CPT | Performed by: INTERNAL MEDICINE

## 2025-03-12 PROCEDURE — 99999 PR PBB SHADOW E&M-EST. PATIENT-LVL III: CPT | Mod: PBBFAC,,, | Performed by: INTERNAL MEDICINE

## 2025-03-12 PROCEDURE — 85025 COMPLETE CBC W/AUTO DIFF WBC: CPT | Performed by: INTERNAL MEDICINE

## 2025-03-12 PROCEDURE — 36415 COLL VENOUS BLD VENIPUNCTURE: CPT | Mod: PN | Performed by: INTERNAL MEDICINE

## 2025-03-12 RX ORDER — TRIAMCINOLONE ACETONIDE 1 MG/G
OINTMENT TOPICAL 2 TIMES DAILY
COMMUNITY
Start: 2025-02-22

## 2025-03-12 RX ORDER — OLOPATADINE HYDROCHLORIDE 1 MG/ML
1 SOLUTION/ DROPS OPHTHALMIC 2 TIMES DAILY
Qty: 5 ML | Refills: 5 | Status: SHIPPED | OUTPATIENT
Start: 2025-03-12

## 2025-03-12 RX ORDER — NEOMYCIN SULFATE, POLYMYXIN B SULFATE AND DEXAMETHASONE 3.5; 10000; 1 MG/ML; [USP'U]/ML; MG/ML
1 SUSPENSION/ DROPS OPHTHALMIC EVERY 8 HOURS
COMMUNITY
Start: 2025-03-05

## 2025-03-12 NOTE — PROGRESS NOTES
Subjective     Patient ID: Roopa Hicks is a 60 y.o. female.    Chief Complaint: Follow-up      History of Present Illness    CHIEF COMPLAINT:  Ms. Hicks presents today for follow-up of multiple medical conditions.    WOUND CARE:  She reports three distinct areas of improper wound healing that are very uncomfortable. She experiences significant pain while sitting, requiring multiple cushions for comfort, particularly in cars. She is using a honey-based treatment which provides soothing and aids in healing. She denies wound drainage and notes that while the wound is progressing toward healing, discomfort persists.    OPHTHALMOLOGIC:  She developed bilateral periorbital ecchymosis approximately 2 weeks ago, more pronounced in one eye. She is currently using prescribed steroid eye drops. She reports significant eye sensitivity to environmental triggers including pollen, dust, and cigarette smoke, with symptoms more severe than simple tearing when exposed to these irritants.  Did see an eye doctor.    DIABETES:  She reports elevated glucose up to 400 at times- she states with stress. She reports poor appetite due to stress.  Check A1c today.  Has monitoring tools.    CARDIOVASCULAR:  She has a cardiac history and recently experienced an episode of bradycardia with heart rate dropping to approximately 48-49 BPM.  Sees Dr. iY regularly and no recent changes.    GASTROINTESTINAL:  She experiences excessive gas after any food or water intake. She was advised to reduce water intake for unclear reasons- seems deemed herself. Following reduced water intake over the past few months, she has developed constipation.  She is requesting to try Linzess while also trying to increase water.  No B symptoms or blood.    FAMILY HISTORY:  Her  was recently diagnosed with three aggressive cancers, including metastatic prostate cancer with lymph node involvement.      ROS:  General: -fever, -chills, -fatigue, -weight  gain, -weight loss, +appetite changes  Eyes: -vision changes, -redness, -discharge, +eye irritation  ENT: -ear pain, -nasal congestion, -sore throat  Cardiovascular: -chest pain, -palpitations, -lower extremity edema  Respiratory: -cough, -shortness of breath  Gastrointestinal: -abdominal pain, -nausea, -vomiting, -diarrhea, +constipation, -blood in stool  Genitourinary: -dysuria, -hematuria, -frequency  Musculoskeletal: -joint pain, -muscle pain  Skin: -rash, -lesion  Neurological: -headache, -dizziness, -numbness, -tingling  Psychiatric: -anxiety, -depression, -sleep difficulty          Past Medical History:   Diagnosis Date    Anxiety     Arthritis     Carpal tunnel syndrome, bilateral     Depression     Diabetic peripheral neuropathy associated with type 2 diabetes mellitus     Essential (primary) hypertension     Gastroparesis due to secondary diabetes     Mild intermittent asthma, uncomplicated     Osteopenia     Renal impairment     Shoulder pain, right     Type 2 diabetes mellitus without complications      Review of patient's allergies indicates:   Allergen Reactions    Grass pollen- grass standard Hives     Past Surgical History:   Procedure Laterality Date    CARPAL TUNNEL RELEASE Left 2024    Procedure: RELEASE, CARPAL TUNNEL;  Surgeon: Renato Shoemaker MD;  Location: [facility];  Service: Orthopedics;  Laterality: Left;  left carpal tunnel release     SECTION      HYSTERECTOMY      INSERTION OF PACEMAKER Bilateral 2021    NEPHRECTOMY Left      Family History   Problem Relation Name Age of Onset    Diabetes Mother      Hypertension Mother      Heart disease Mother      Diabetes Father      Hypertension Father      Heart disease Father      Cancer Maternal Grandmother       Social History[1]      /68 (BP Location: Right arm)   Pulse 71   Temp 97.4 °F (36.3 °C) (Tympanic)   Wt 52.4 kg (115 lb 10.1 oz)   SpO2 99%   BMI 19.85 kg/m²   Outpatient Medications as of  3/12/2025   Medication Sig Dispense Refill    ACCU-CHEK KACI PLUS TEST STRP Strp USE TO TEST BLOOD SUGAR  each 1    ACCU-CHEK SOFTCLIX LANCETS Misc TEST BLOOD SUGAR  each 1    albuterol (PROVENTIL/VENTOLIN HFA) 90 mcg/actuation inhaler Inhale 2 puffs into the lungs 4 (four) times daily as needed.      atorvastatin (LIPITOR) 40 MG tablet Take 40 mg by mouth once daily.      blood-glucose sensor (FREESTYLE ASTER 3 SENSOR) Tati CHANGE SENSOR EVERY 14     DAYS. 6 each 3    cholecalciferol, vitamin D3, 1,250 mcg (50,000 unit) capsule Take 1 capsule (50,000 Units total) by mouth once a week. 12 capsule 1    diclofenac sodium (VOLTAREN) 1 % Gel Apply topically 2 (two) times daily. 300 g 1    empagliflozin (JARDIANCE) 10 mg tablet Take 1 tablet (10 mg total) by mouth once daily. 90 tablet 3    gabapentin (NEURONTIN) 600 MG tablet Take 600 mg by mouth 3 (three) times daily as needed.      glipiZIDE (GLUCOTROL) 2.5 MG TR24 Take 2.5 mg by mouth daily with breakfast.      HYDROcodone-acetaminophen (NORCO) 5-325 mg per tablet Take 1 tablet by mouth every 6 (six) hours as needed for Pain. 15 tablet 0    insulin lispro (HUMALOG KWIKPEN INSULIN) 100 unit/mL pen Inject 10 Units into the skin 3 (three) times daily. 27 mL 3    metoprolol succinate (TOPROL-XL) 25 MG 24 hr tablet Take 1 tablet (25 mg total) by mouth every morning. 90 tablet 3    mirtazapine (REMERON) 30 MG tablet Take 1 tablet (30 mg total) by mouth every evening. 90 tablet 3    mupirocin (BACTROBAN) 2 % ointment Apply topically 3 (three) times daily. 60 g 2    nebulizer and compressor Tati Use every 4-6 hrs prn for wheezing 1 each 0    neomycin-polymyxin-dexamethasone (MAXITROL) 3.5mg/mL-10,000 unit/mL-0.1 % DrpS Place 1 drop into both eyes every 8 (eight) hours.      ondansetron (ZOFRAN-ODT) 8 MG TbDL Take 1 tablet (8 mg total) by mouth every 12 (twelve) hours as needed (nausea). 30 tablet 11    sacubitriL-valsartan (ENTRESTO) 24-26 mg per tablet Take 1  "tablet by mouth 2 (two) times daily. 180 tablet 3    spironolactone (ALDACTONE) 25 MG tablet Take 1 tablet (25 mg total) by mouth every morning. 90 tablet 3    tirzepatide (MOUNJARO) 10 mg/0.5 mL PnIj Inject 10 mg into the skin every 7 days. 24 Pen 1    traMADol (ULTRAM) 50 mg tablet TK 1 T PO  BID (Patient taking differently: Take 50 mg by mouth once daily.) 45 tablet 1    triamcinolone acetonide 0.1% (KENALOG) 0.1 % ointment Apply topically 2 (two) times daily.      bumetanide (BUMEX) 1 MG tablet Take 1 mg by mouth daily as needed.      furosemide (LASIX) 40 MG tablet 1 daily as needed per guidelines (Patient not taking: Reported on 1/30/2025) 90 tablet 1    ivabradine (CORLANOR) 5 mg Tab Take 1 tablet by mouth 2 (two) times daily. (Patient not taking: Reported on 1/30/2025)      olopatadine (PATANOL) 0.1 % ophthalmic solution Place 1 drop into both eyes 2 (two) times daily. 5 mL 5    pen needle, diabetic (BD ULTRA-FINE POLO PEN NEEDLE) 32 gauge x 5/32" Ndle USE ONCE DAILY WITH BASAGLAR 300 each 1    pregabalin (LYRICA) 100 MG capsule Take 1 capsule (100 mg total) by mouth 2 (two) times daily. 180 capsule 1     No current facility-administered medications on file as of 3/12/2025.              Objective     Physical Exam    General: No acute distress. Well-developed. Well-nourished.  Eyes: EOMI. Sclerae anicteric.  HENT: Normocephalic. Atraumatic. Nares patent.   Cardiovascular: Regular rate.   Respiratory: Normal respiratory effort.   Abdomen: Soft.   Musculoskeletal: No  obvious deformity.  Extremities: No lower extremity edema.  Neurological: Alert & oriented x3. No slurred speech. Normal gait.  Psychiatric: Normal mood. Normal affect. Good insight. Good judgment.  Skin: Warm. Dry. No rash.   Thin extremities  Top of gluteal cleft:  skin ulcer w/o erythema/edema or drainage; improving            Assessment and Plan     1. Follow-up exam    2. Diabetes mellitus due to underlying condition with hyperglycemia, with " long-term current use of insulin  -     Hemoglobin A1C; Future; Expected date: 03/12/2025  -     CBC Auto Differential; Future; Expected date: 03/12/2025  -     Basic Metabolic Panel; Future; Expected date: 03/12/2025    3. Major depressive disorder, recurrent severe without psychotic features  Comments:  no acute issues;  dx with metastatic cancer recently; pt has good support    4. Stress due to illness of family member    5. Allergic conjunctivitis of both eyes  -     olopatadine (PATANOL) 0.1 % ophthalmic solution; Place 1 drop into both eyes 2 (two) times daily.  Dispense: 5 mL; Refill: 5    6. Slow transit constipation  -     linaCLOtide (LINZESS) 72 mcg Cap capsule; Take 1 capsule (72 mcg total) by mouth before breakfast.  Dispense: 30 capsule; Refill: 11       Continue with wound care  Has good family support   Increase water - she is requesting a trial of linzess.      Follow up in about 6 months (around 9/12/2025).    Immunization History   Administered Date(s) Administered    COVID-19, MRNA, LN-S, PF (MODERNA FULL 0.5 ML DOSE) 02/27/2021, 02/27/2021, 02/27/2021, 03/29/2021, 03/29/2021, 08/17/2021, 08/17/2021, 07/09/2022    COVID-19, mRNA, LNP-S, PF (Moderna) Ages 12+ 01/23/2024    Influenza - Quadrivalent 10/09/2020    Influenza - Quadrivalent - PF *Preferred* (6 months and older) 10/23/2019, 10/09/2020, 11/30/2021, 10/07/2022, 10/06/2023    Pneumococcal Polysaccharide - 23 Valent 05/05/2023    Td (ADULT) 05/19/2005    Tdap 05/19/2005, 05/19/2005, 07/10/2020       I spent a total of 30 minutes on the day of the visit.This includes face to face time and non-face to face time preparing to see the patient (eg, review of tests), obtaining and/or reviewing separately obtained history, documenting clinical information in the electronic or other health record, independently interpreting results and communicating results to the patient/family/caregiver, or care coordinator.    Visit today included  increased complexity associated with the care of the episodic problem hyperglycemia addressed and managing the longitudinal care of the patient due to the serious and/or complex managed problem(s) diabetes.           This note was generated with the assistance of ambient listening technology. Verbal consent was obtained by the patient and accompanying visitor(s) for the recording of patient appointment to facilitate this note. I attest to having reviewed and edited the generated note for accuracy, though some syntax or spelling errors may persist. Please contact the author of this note for any clarification.           [1]   Social History  Socioeconomic History    Marital status:    Tobacco Use    Smoking status: Never     Passive exposure: Never    Smokeless tobacco: Never   Substance and Sexual Activity    Alcohol use: Never    Drug use: Never    Sexual activity: Not Currently     Social Drivers of Health     Financial Resource Strain: Medium Risk (11/11/2024)    Overall Financial Resource Strain (CARDIA)     Difficulty of Paying Living Expenses: Somewhat hard   Food Insecurity: No Food Insecurity (1/6/2025)    [facility]    Hunger Vital Sign     Worried About Running Out of Food in the Last Year: Never true     Ran Out of Food in the Last Year: Never true   Recent Concern: Food Insecurity - Food Insecurity Present (11/11/2024)    Hunger Vital Sign     Worried About Running Out of Food in the Last Year: Sometimes true     Ran Out of Food in the Last Year: Sometimes true   Transportation Needs: No Transportation Needs (1/6/2025)    Received from [facility]    PRAPARE - Transportation     Lack of Transportation (Medical): No     Lack of Transportation (Non-Medical): No   Physical Activity: Unknown (11/11/2024)    Exercise Vital Sign     Days of  Exercise per Week: 3 days   Stress: Stress Concern Present (11/11/2024)    [employer] [place] [employer] - Occupational Stress Questionnaire     Feeling of Stress : To some extent   Housing Stability: Low Risk  (1/6/2025)    Received from [facility] Subsidiaries and Affiliates    Housing Stability Vital Sign     Unable to Pay for Housing in the Last Year: No     Number of Times Moved in the Last Year: 1     Homeless in the Last Year: No   Recent Concern: Housing Stability - High Risk (11/11/2024)    Housing Stability Vital Sign     Unable to Pay for Housing in the Last Year: Yes

## 2025-03-13 ENCOUNTER — PATIENT MESSAGE (OUTPATIENT)
Dept: RHEUMATOLOGY | Facility: CLINIC | Age: 60
End: 2025-03-13
Payer: COMMERCIAL

## 2025-03-17 ENCOUNTER — RESULTS FOLLOW-UP (OUTPATIENT)
Dept: PRIMARY CARE CLINIC | Facility: CLINIC | Age: 60
End: 2025-03-17

## 2025-03-20 ENCOUNTER — TELEPHONE (OUTPATIENT)
Dept: PRIMARY CARE CLINIC | Facility: CLINIC | Age: 60
End: 2025-03-20
Payer: COMMERCIAL

## 2025-03-25 ENCOUNTER — OFFICE VISIT (OUTPATIENT)
Dept: PRIMARY CARE CLINIC | Facility: CLINIC | Age: 60
End: 2025-03-25
Payer: COMMERCIAL

## 2025-03-25 ENCOUNTER — HOSPITAL ENCOUNTER (OUTPATIENT)
Dept: RADIOLOGY | Facility: HOSPITAL | Age: 60
Discharge: HOME | End: 2025-03-25
Attending: INTERNAL MEDICINE
Payer: COMMERCIAL

## 2025-03-25 VITALS
RESPIRATION RATE: 18 BRPM | TEMPERATURE: 97 F | WEIGHT: 115 LBS | SYSTOLIC BLOOD PRESSURE: 144 MMHG | DIASTOLIC BLOOD PRESSURE: 86 MMHG | OXYGEN SATURATION: 97 % | BODY MASS INDEX: 19.74 KG/M2 | HEART RATE: 88 BPM

## 2025-03-25 DIAGNOSIS — Z91.81 HISTORY OF RECENT FALL: Primary | ICD-10-CM

## 2025-03-25 DIAGNOSIS — Z02.9 ADMINISTRATIVE ENCOUNTER: ICD-10-CM

## 2025-03-25 DIAGNOSIS — Z91.81 HISTORY OF RECENT FALL: ICD-10-CM

## 2025-03-25 PROCEDURE — 99999 PR PBB SHADOW E&M-EST. PATIENT-LVL III: CPT | Mod: PBBFAC,,, | Performed by: INTERNAL MEDICINE

## 2025-03-25 PROCEDURE — 73030 X-RAY EXAM OF SHOULDER: CPT | Mod: 26,RT,, | Performed by: RADIOLOGY

## 2025-03-25 PROCEDURE — 99214 OFFICE O/P EST MOD 30 MIN: CPT | Mod: S$GLB,,, | Performed by: INTERNAL MEDICINE

## 2025-03-25 PROCEDURE — 3008F BODY MASS INDEX DOCD: CPT | Mod: CPTII,S$GLB,, | Performed by: INTERNAL MEDICINE

## 2025-03-25 PROCEDURE — 72220 X-RAY EXAM SACRUM TAILBONE: CPT | Mod: TC

## 2025-03-25 PROCEDURE — 3079F DIAST BP 80-89 MM HG: CPT | Mod: CPTII,S$GLB,, | Performed by: INTERNAL MEDICINE

## 2025-03-25 PROCEDURE — 73030 X-RAY EXAM OF SHOULDER: CPT | Mod: TC,RT

## 2025-03-25 PROCEDURE — 72220 X-RAY EXAM SACRUM TAILBONE: CPT | Mod: 26,,, | Performed by: RADIOLOGY

## 2025-03-25 PROCEDURE — 3077F SYST BP >= 140 MM HG: CPT | Mod: CPTII,S$GLB,, | Performed by: INTERNAL MEDICINE

## 2025-03-25 PROCEDURE — 3051F HG A1C>EQUAL 7.0%<8.0%: CPT | Mod: CPTII,S$GLB,, | Performed by: INTERNAL MEDICINE

## 2025-03-25 PROCEDURE — 4010F ACE/ARB THERAPY RXD/TAKEN: CPT | Mod: CPTII,S$GLB,, | Performed by: INTERNAL MEDICINE

## 2025-03-27 DIAGNOSIS — H10.13 ALLERGIC CONJUNCTIVITIS OF BOTH EYES: ICD-10-CM

## 2025-03-27 DIAGNOSIS — K59.01 SLOW TRANSIT CONSTIPATION: ICD-10-CM

## 2025-03-27 RX ORDER — OLOPATADINE HYDROCHLORIDE 1 MG/ML
1 SOLUTION/ DROPS OPHTHALMIC 2 TIMES DAILY
Qty: 5 ML | Refills: 5 | Status: SHIPPED | OUTPATIENT
Start: 2025-03-27

## 2025-03-27 NOTE — TELEPHONE ENCOUNTER
----- Message from Summer sent at 3/27/2025  3:24 PM CDT -----  Contact: Roopa  Type:  Patient Call Back Request Who Called: Roopa Message for Patient: Nurse What this is regarding?: Her medication Would the patient rather a call back or a response via [facility]? Call back Best Call Back Number: [phone] Information: She has not receive any of her medication that she had request 2 weeks ago. She would like for someone to call the pharmacy[address] [address] [address] [address] [facility] [place] [address]Mhkxc: [phone] Fax: [phone]

## 2025-03-27 NOTE — TELEPHONE ENCOUNTER
Pharmacy pt wants to use:    [facility]  [address]    Asking about a muscle relaxer for tailbone pain?    Resend Lila to requested pharmacy please.

## 2025-03-27 NOTE — TELEPHONE ENCOUNTER
No care due was identified.  [facility] Embedded Care Due Messages. Reference number: [id number].   3/27/2025 4:13:13 PM CDT

## 2025-03-29 NOTE — PROGRESS NOTES
Subjective     Patient ID: Roopa Hicks is a 60 y.o. female.    Chief Complaint: Fall      History of Present Illness    CHIEF COMPLAINT:  Ms. Hicks presents today for follow-up on multiple issues including dental concerns, recent fall, and eye problems.    INJURY FROM RECENT FALL:  She fell while exiting the passenger side of car, landing underneath vehicle. She reports persistent pain in right scapular region with significant bruising and swelling. She experienced headaches and double vision for approximately one week following the fall, which have since resolved. CT was performed following the incident.    EYE CONCERNS:  She reports right eye symptoms including visible dripping sensation in front of the right eyeball and murky vision through glasses. Prescription eye drops were sent to pharmacy but awaiting insurance.  This was proceeding the fall.    DENTAL:  She reports a broken tooth requiring extraction. She has an appointment scheduled for dental extraction on the 31st, which will involve sedation and a bone graft procedure.    CARDIOVASCULAR HISTORY:  She has a history of defibrillator placement. She reports dyspnea since childhood, noting she has been getting winded since age 4. She states that with medications she feels healthy but continues to experience dyspnea. She has a history of swimming and was encouraged to breathe while in the water as a child.    MEDICATIONS:  She takes Lyrica and gabapentin, noting that high doses of gabapentin cause GI upset and drowsiness. She takes blood pressure medication as needed, denying chronic blood pressure issues.      ROS:  General: -fever, -chills, +fatigue, -weight gain, -weight loss  Eyes: -vision changes, -redness, +discharge, +blurry vision  ENT: -ear pain, -nasal congestion, -sore throat  Cardiovascular: -chest pain, -palpitations, -lower extremity edema  Respiratory: -cough, -shortness of breath, +difficulty breathing  Gastrointestinal: -abdominal  pain, -nausea, -vomiting, -diarrhea, -constipation, -blood in stool  Genitourinary: -dysuria, -hematuria, -frequency  Musculoskeletal: -joint pain, -muscle pain  Skin: -rash, -lesion, +easy bruising  Neurological: -headache, -dizziness, -numbness, -tingling  Psychiatric: -anxiety, -depression, -sleep difficulty  Head: +tooth pain          Past Medical History:   Diagnosis Date    Anxiety     Arthritis     Carpal tunnel syndrome, bilateral     Depression     Diabetic peripheral neuropathy associated with type 2 diabetes mellitus     Essential (primary) hypertension     Gastroparesis due to secondary diabetes     Mild intermittent asthma, uncomplicated     Osteopenia     Renal impairment     Shoulder pain, right     Type 2 diabetes mellitus without complications      Review of patient's allergies indicates:   Allergen Reactions    Grass pollen- grass standard Hives     Past Surgical History:   Procedure Laterality Date    CARPAL TUNNEL RELEASE Left 2024    Procedure: RELEASE, CARPAL TUNNEL;  Surgeon: Renato Shoemaker MD;  Location: [facility];  Service: Orthopedics;  Laterality: Left;  left carpal tunnel release     SECTION      HYSTERECTOMY      INSERTION OF PACEMAKER Bilateral 2021    NEPHRECTOMY Left      Family History   Problem Relation Name Age of Onset    Diabetes Mother      Hypertension Mother      Heart disease Mother      Diabetes Father      Hypertension Father      Heart disease Father      Cancer Maternal Grandmother       Social History[1]      BP (!) 144/86 (BP Location: Right arm, Patient Position: Sitting)   Pulse 88   Temp 96.5 °F (35.8 °C)   Resp 18   Wt 52.2 kg (115 lb)   SpO2 97%   BMI 19.74 kg/m²   Outpatient Medications as of 3/25/2025   Medication Sig Dispense Refill    ACCU-CHEK KACI PLUS TEST STRP Strp USE TO TEST BLOOD SUGAR  each 1    ACCU-CHEK SOFTCLIX LANCETS Misc TEST BLOOD SUGAR  each 1    albuterol (PROVENTIL/VENTOLIN HFA) 90  "mcg/actuation inhaler Inhale 2 puffs into the lungs 4 (four) times daily as needed.      atorvastatin (LIPITOR) 40 MG tablet Take 40 mg by mouth once daily.      blood-glucose sensor (FREESTYLE ASTER 3 SENSOR) Tati CHANGE SENSOR EVERY 14     DAYS. 6 each 3    cholecalciferol, vitamin D3, 1,250 mcg (50,000 unit) capsule Take 1 capsule (50,000 Units total) by mouth once a week. 12 capsule 1    diclofenac sodium (VOLTAREN) 1 % Gel Apply topically 2 (two) times daily. 300 g 1    empagliflozin (JARDIANCE) 10 mg tablet Take 1 tablet (10 mg total) by mouth once daily. 90 tablet 3    furosemide (LASIX) 40 MG tablet 1 daily as needed per guidelines 90 tablet 1    gabapentin (NEURONTIN) 600 MG tablet Take 600 mg by mouth 3 (three) times daily as needed.      glipiZIDE (GLUCOTROL) 2.5 MG TR24 Take 2.5 mg by mouth daily with breakfast.      HYDROcodone-acetaminophen (NORCO) 5-325 mg per tablet Take 1 tablet by mouth every 6 (six) hours as needed for Pain. 15 tablet 0    insulin lispro (HUMALOG KWIKPEN INSULIN) 100 unit/mL pen Inject 10 Units into the skin 3 (three) times daily. 27 mL 3    ivabradine (CORLANOR) 5 mg Tab Take 1 tablet by mouth 2 (two) times daily.      metoprolol succinate (TOPROL-XL) 25 MG 24 hr tablet Take 1 tablet (25 mg total) by mouth every morning. 90 tablet 3    mirtazapine (REMERON) 30 MG tablet Take 1 tablet (30 mg total) by mouth every evening. 90 tablet 3    mupirocin (BACTROBAN) 2 % ointment Apply topically 3 (three) times daily. 60 g 2    nebulizer and compressor Tati Use every 4-6 hrs prn for wheezing 1 each 0    neomycin-polymyxin-dexamethasone (MAXITROL) 3.5mg/mL-10,000 unit/mL-0.1 % DrpS Place 1 drop into both eyes every 8 (eight) hours.      ondansetron (ZOFRAN-ODT) 8 MG TbDL Take 1 tablet (8 mg total) by mouth every 12 (twelve) hours as needed (nausea). 30 tablet 11    pen needle, diabetic (BD ULTRA-FINE POLO PEN NEEDLE) 32 gauge x 5/32" Ndle USE ONCE DAILY WITH BASAGLAR 300 each 1    " sacubitriL-valsartan (ENTRESTO) 24-26 mg per tablet Take 1 tablet by mouth 2 (two) times daily. 180 tablet 3    spironolactone (ALDACTONE) 25 MG tablet Take 1 tablet (25 mg total) by mouth every morning. 90 tablet 3    tirzepatide (MOUNJARO) 10 mg/0.5 mL PnIj Inject 10 mg into the skin every 7 days. 24 Pen 1    traMADol (ULTRAM) 50 mg tablet TK 1 T PO  BID (Patient taking differently: Take 50 mg by mouth once daily.) 45 tablet 1    triamcinolone acetonide 0.1% (KENALOG) 0.1 % ointment Apply topically 2 (two) times daily.       No current facility-administered medications on file as of 3/25/2025.              Objective     Physical Exam    General: No acute distress. Well-developed. Well-nourished.  Eyes: EOMI. Sclerae anicteric.  HENT: Normocephalic. Atraumatic. Nares patent. Moist oral mucosa.  Ears: Bilateral TMs clear. Bilateral EACs clear.  Cardiovascular: Regular rate. +defib left chest  Respiratory: Normal respiratory effort. Clear to auscultation bilaterally. No rales. No rhonchi. No wheezing.  Abdomen: Soft. Non-tender. Non-distended. Normoactive bowel sounds.  Musculoskeletal: No  obvious deformity. Pain in scapula. Bruising on scapula.  Extremities: No lower extremity edema.  Neurological: Alert & oriented x3. No slurred speech. Normal gait.  Psychiatric: Normal mood. Normal affect. Good insight. Good judgment.  Skin: Warm. Dry. No rash.      Thin       Assessment and Plan     1. History of recent fall  -     X-ray Shoulder 2 or More Views Right; Future; Expected date: 03/25/2025  -     X-Ray Sacrum And Coccyx; Future; Expected date: 03/25/2025    2. Administrative encounter  Comments:  papers for dental work completed during visit             Immunization History   Administered Date(s) Administered    COVID-19, MRNA, LN-S, PF (MODERNA FULL 0.5 ML DOSE) 02/27/2021, 02/27/2021, 02/27/2021, 03/29/2021, 03/29/2021, 08/17/2021, 08/17/2021, 07/09/2022    COVID-19, mRNA, LNP-S, PF (Moderna) Ages 12+ 01/23/2024     Influenza - Quadrivalent 10/09/2020    Influenza - Quadrivalent - PF *Preferred* (6 months and older) 10/23/2019, 10/09/2020, 11/30/2021, 10/07/2022, 10/06/2023    Pneumococcal Polysaccharide - 23 Valent 05/05/2023    Td (ADULT) 05/19/2005    Tdap 05/19/2005, 05/19/2005, 07/10/2020       I spent a total of 30 minutes on the day of the visit.This includes face to face time and non-face to face time preparing to see the patient (eg, review of tests), obtaining and/or reviewing separately obtained history, documenting clinical information in the electronic or other health record, independently interpreting results and communicating results to the patient/family/caregiver, or care coordinator.         This note was generated with the assistance of ambient listening technology. Verbal consent was obtained by the patient and accompanying visitor(s) for the recording of patient appointment to facilitate this note. I attest to having reviewed and edited the generated note for accuracy, though some syntax or spelling errors may persist. Please contact the author of this note for any clarification.           [1]   Social History  Socioeconomic History    Marital status:    Tobacco Use    Smoking status: Never     Passive exposure: Never    Smokeless tobacco: Never   Substance and Sexual Activity    Alcohol use: Never    Drug use: Never    Sexual activity: Not Currently     Social Drivers of Health     Financial Resource Strain: Medium Risk (11/11/2024)    Overall Financial Resource Strain (CARDIA)     Difficulty of Paying Living Expenses: Somewhat hard   Food Insecurity: No Food Insecurity (1/6/2025)    Received from [facility] and Its Subsidiaries and Affiliates    Hunger Vital Sign     Worried About Running Out of Food in the Last Year: Never true     Ran Out of Food in the Last Year: Never true   Recent Concern: Food Insecurity - Food Insecurity Present (11/11/2024)     Hunger Vital Sign     Worried About Running Out of Food in the Last Year: Sometimes true     Ran Out of Food in the Last Year: Sometimes true   Transportation Needs: No Transportation Needs (1/6/2025)    [facility]    PRAPARE - Transportation     Lack of Transportation (Medical): No     Lack of Transportation (Non-Medical): No   Physical Activity: Unknown (11/11/2024)    Exercise Vital Sign     Days of Exercise per Week: 3 days   Stress: Stress Concern Present (11/11/2024)    [employer] - Occupational Stress Questionnaire     Feeling of Stress : To some extent   Housing Stability: Low Risk  (1/6/2025)    [facility]    Housing Stability Vital Sign     Unable to Pay for Housing in the Last Year: No     Number of Times Moved in the Last Year: 1     Homeless in the Last Year: No   Recent Concern: Housing Stability - High Risk (11/11/2024)    Housing Stability Vital Sign     Unable to Pay for Housing in the Last Year: Yes

## 2025-04-08 ENCOUNTER — EXTERNAL HOME HEALTH (OUTPATIENT)
Dept: HOME HEALTH SERVICES | Facility: HOSPITAL | Age: 60
End: 2025-04-08
Payer: COMMERCIAL

## 2025-04-10 ENCOUNTER — DOCUMENT SCAN (OUTPATIENT)
Dept: HOME HEALTH SERVICES | Facility: HOSPITAL | Age: 60
End: 2025-04-10
Payer: COMMERCIAL

## 2025-04-15 ENCOUNTER — OFFICE VISIT (OUTPATIENT)
Dept: ORTHOPEDICS | Facility: CLINIC | Age: 60
End: 2025-04-15
Payer: COMMERCIAL

## 2025-04-15 ENCOUNTER — DOCUMENT SCAN (OUTPATIENT)
Dept: HOME HEALTH SERVICES | Facility: HOSPITAL | Age: 60
End: 2025-04-15
Payer: COMMERCIAL

## 2025-04-15 VITALS — BODY MASS INDEX: 19.64 KG/M2 | WEIGHT: 115.06 LBS | HEIGHT: 64 IN

## 2025-04-15 DIAGNOSIS — G56.03 CARPAL TUNNEL SYNDROME, BILATERAL: Primary | ICD-10-CM

## 2025-04-15 DIAGNOSIS — M18.0 ARTHRITIS OF CARPOMETACARPAL (CMC) JOINTS OF BOTH THUMBS: ICD-10-CM

## 2025-04-15 PROCEDURE — 1160F RVW MEDS BY RX/DR IN RCRD: CPT | Mod: CPTII,S$GLB,, | Performed by: ORTHOPAEDIC SURGERY

## 2025-04-15 PROCEDURE — 1159F MED LIST DOCD IN RCRD: CPT | Mod: CPTII,S$GLB,, | Performed by: ORTHOPAEDIC SURGERY

## 2025-04-15 PROCEDURE — 20600 DRAIN/INJ JOINT/BURSA W/O US: CPT | Mod: 50,51,XS,S$GLB | Performed by: ORTHOPAEDIC SURGERY

## 2025-04-15 PROCEDURE — 20526 THER INJECTION CARP TUNNEL: CPT | Mod: 50,S$GLB,, | Performed by: ORTHOPAEDIC SURGERY

## 2025-04-15 PROCEDURE — 3008F BODY MASS INDEX DOCD: CPT | Mod: CPTII,S$GLB,, | Performed by: ORTHOPAEDIC SURGERY

## 2025-04-15 PROCEDURE — 99213 OFFICE O/P EST LOW 20 MIN: CPT | Mod: 25,S$GLB,, | Performed by: ORTHOPAEDIC SURGERY

## 2025-04-15 PROCEDURE — 4010F ACE/ARB THERAPY RXD/TAKEN: CPT | Mod: CPTII,S$GLB,, | Performed by: ORTHOPAEDIC SURGERY

## 2025-04-15 PROCEDURE — 99999 PR PBB SHADOW E&M-EST. PATIENT-LVL III: CPT | Mod: PBBFAC,,, | Performed by: ORTHOPAEDIC SURGERY

## 2025-04-15 PROCEDURE — 3051F HG A1C>EQUAL 7.0%<8.0%: CPT | Mod: CPTII,S$GLB,, | Performed by: ORTHOPAEDIC SURGERY

## 2025-04-15 RX ORDER — TRIAMCINOLONE ACETONIDE 40 MG/ML
40 INJECTION, SUSPENSION INTRA-ARTICULAR; INTRAMUSCULAR
Status: DISCONTINUED | OUTPATIENT
Start: 2025-04-15 | End: 2025-04-15 | Stop reason: HOSPADM

## 2025-04-15 RX ADMIN — TRIAMCINOLONE ACETONIDE 40 MG: 40 INJECTION, SUSPENSION INTRA-ARTICULAR; INTRAMUSCULAR at 11:04

## 2025-04-15 NOTE — PROCEDURES
Small Joint Aspiration/Injection: R thumb CMC, L thumb CMC    Date/Time: 4/15/2025 11:30 AM    Performed by: Renato Shoemaker MD  Authorized by: Renato Shoemaker MD    Consent Done?:  Yes (Verbal)  Indications:  Pain and arthritis  Site marked: the procedure site was marked    Timeout: prior to procedure the correct patient, procedure, and site was verified    Prep: patient was prepped and draped in usual sterile fashion      Local anesthesia used?: Yes    Local anesthetic:  Lidocaine 2% without epinephrine  Anesthetic total (ml):  1    Location:  Thumb  Site:  R thumb CMC and L thumb CMC  Ultrasonic guidance for needle placement?: No    Needle size:  25 G  Approach:  Dorsal  Medications:  40 mg triamcinolone acetonide 40 mg/mL; 40 mg triamcinolone acetonide 40 mg/mL

## 2025-04-15 NOTE — PROGRESS NOTES
Subjective:     Patient ID: Roopa Hicks is a 60 y.o. female.    Chief Complaint: Pain of the Left Hand and Pain of the Right Hand      HPI:  The patient is a 60-year-old female with bilateral carpal tunnel syndrome documented by nerve conduction studies as well as bilateral thumb basal joint arthritis.  She is status post left carpal tunnel release 2024 with good results and wishes injection both carpal tunnels today and bilateral thumb basal joints.    Past Medical History:   Diagnosis Date    Anxiety     Arthritis     Carpal tunnel syndrome, bilateral     Depression     Diabetic peripheral neuropathy associated with type 2 diabetes mellitus     Essential (primary) hypertension     Gastroparesis due to secondary diabetes     Mild intermittent asthma, uncomplicated     Osteopenia     Renal impairment     Shoulder pain, right     Type 2 diabetes mellitus without complications      Past Surgical History:   Procedure Laterality Date    CARPAL TUNNEL RELEASE Left 2024    Procedure: RELEASE, CARPAL TUNNEL;  Surgeon: Renato Shoemaker MD;  Location: [facility];  Service: Orthopedics;  Laterality: Left;  left carpal tunnel release     SECTION      HYSTERECTOMY      INSERTION OF PACEMAKER Bilateral 2021    NEPHRECTOMY Left      Family History   Problem Relation Name Age of Onset    Diabetes Mother      Hypertension Mother      Heart disease Mother      Diabetes Father      Hypertension Father      Heart disease Father      Cancer Maternal Grandmother       Social History[1]  Medication List with Changes/Refills   Current Medications    ACCU-CHEK KACI PLUS TEST STRP STRP    USE TO TEST BLOOD SUGAR TID    ACCU-CHEK SOFTCLIX LANCETS MISC    TEST BLOOD SUGAR TID    ALBUTEROL (PROVENTIL/VENTOLIN HFA) 90 MCG/ACTUATION INHALER    Inhale 2 puffs into the lungs 4 (four) times daily as needed.    ATORVASTATIN (LIPITOR) 40 MG TABLET    Take 40 mg by mouth once daily.    BLOOD-GLUCOSE SENSOR (FREESTYLE  "ASTER 3 SENSOR) RIANNA    CHANGE SENSOR EVERY 14     DAYS.    BUMETANIDE (BUMEX) 1 MG TABLET    Take 1 mg by mouth daily as needed.    CHOLECALCIFEROL, VITAMIN D3, 1,250 MCG (50,000 UNIT) CAPSULE    Take 1 capsule (50,000 Units total) by mouth once a week.    DICLOFENAC SODIUM (VOLTAREN) 1 % GEL    Apply topically 2 (two) times daily.    EMPAGLIFLOZIN (JARDIANCE) 10 MG TABLET    Take 1 tablet (10 mg total) by mouth once daily.    FUROSEMIDE (LASIX) 40 MG TABLET    1 daily as needed per guidelines    GABAPENTIN (NEURONTIN) 600 MG TABLET    Take 600 mg by mouth 3 (three) times daily as needed.    GLIPIZIDE (GLUCOTROL) 2.5 MG TR24    Take 2.5 mg by mouth daily with breakfast.    HYDROCODONE-ACETAMINOPHEN (NORCO) 5-325 MG PER TABLET    Take 1 tablet by mouth every 6 (six) hours as needed for Pain.    INSULIN LISPRO (HUMALOG KWIKPEN INSULIN) 100 UNIT/ML PEN    Inject 10 Units into the skin 3 (three) times daily.    IVABRADINE (CORLANOR) 5 MG TAB    Take 1 tablet by mouth 2 (two) times daily.    LINACLOTIDE (LINZESS) 72 MCG CAP CAPSULE    Take 1 capsule (72 mcg total) by mouth before breakfast.    METOPROLOL SUCCINATE (TOPROL-XL) 25 MG 24 HR TABLET    Take 1 tablet (25 mg total) by mouth every morning.    MIRTAZAPINE (REMERON) 30 MG TABLET    Take 1 tablet (30 mg total) by mouth every evening.    MUPIROCIN (BACTROBAN) 2 % OINTMENT    Apply topically 3 (three) times daily.    NEBULIZER AND COMPRESSOR RIANNA    Use every 4-6 hrs prn for wheezing    NEOMYCIN-POLYMYXIN-DEXAMETHASONE (MAXITROL) 3.5MG/ML-10,000 UNIT/ML-0.1 % DRPS    Place 1 drop into both eyes every 8 (eight) hours.    OLOPATADINE (PATANOL) 0.1 % OPHTHALMIC SOLUTION    Place 1 drop into both eyes 2 (two) times daily.    ONDANSETRON (ZOFRAN-ODT) 8 MG TBDL    Take 1 tablet (8 mg total) by mouth every 12 (twelve) hours as needed (nausea).    PEN NEEDLE, DIABETIC (BD ULTRA-FINE POLO PEN NEEDLE) 32 GAUGE X 5/32" NDLE    USE ONCE DAILY WITH BASAGLAR    PREGABALIN " (LYRICA) 100 MG CAPSULE    Take 1 capsule (100 mg total) by mouth 2 (two) times daily.    SACUBITRIL-VALSARTAN (ENTRESTO) 24-26 MG PER TABLET    Take 1 tablet by mouth 2 (two) times daily.    SPIRONOLACTONE (ALDACTONE) 25 MG TABLET    Take 1 tablet (25 mg total) by mouth every morning.    TIRZEPATIDE (MOUNJARO) 10 MG/0.5 ML PNIJ    Inject 10 mg into the skin every 7 days.    TRAMADOL (ULTRAM) 50 MG TABLET    TK 1 T PO  BID    TRIAMCINOLONE ACETONIDE 0.1% (KENALOG) 0.1 % OINTMENT    Apply topically 2 (two) times daily.     Review of patient's allergies indicates:   Allergen Reactions    Grass pollen-june grass standard Hives     Review of Systems   Constitutional: Negative for malaise/fatigue.   HENT:  Negative for hearing loss.    Eyes:  Negative for double vision and visual disturbance.   Cardiovascular:  Negative for chest pain.   Respiratory:  Positive for wheezing. Negative for shortness of breath.    Endocrine: Negative for cold intolerance.   Hematologic/Lymphatic: Does not bruise/bleed easily.   Skin:  Negative for poor wound healing and suspicious lesions.   Musculoskeletal:  Positive for arthritis, joint pain, joint swelling and stiffness. Negative for gout.   Gastrointestinal:  Positive for constipation and diarrhea. Negative for nausea and vomiting.   Genitourinary:  Negative for dysuria.   Neurological:  Positive for focal weakness, numbness, paresthesias and sensory change.   Psychiatric/Behavioral:  Positive for altered mental status. Negative for depression, memory loss and substance abuse. The patient is nervous/anxious.    Allergic/Immunologic: Negative for persistent infections.       Objective:   Body mass index is 19.75 kg/m².  There were no vitals filed for this visit.             General    Constitutional: She is oriented to person, place, and time. She appears well-developed and well-nourished. No distress.   HENT:   Head: Normocephalic.   Eyes: EOM are normal.   Pulmonary/Chest: Effort  normal.   Neurological: She is oriented to person, place, and time.   Psychiatric: She has a normal mood and affect.             Right Hand/Wrist Exam     Inspection   Scars: Wrist - absent Hand -  absent  Effusion: Wrist - absent Hand -  absent    Pain   Wrist - The patient exhibits pain of the flexor/pronator group and CMC.    Tests   Phalens sign: positive  Tinel's sign (median nerve): positive  Carpal Tunnel Compression Test: positive    Atrophy   Thenar:  negative  Intrinsic:  negative    Other     Neuorologic Exam    Median Distribution: abnormal  Ulnar Distribution: normal  Radial Distribution: normal    Comments:  The patient has a positive Tinel and positive Phalen sign.  There is no thenar atrophy noted.  There is tenderness basal joint right thumb with a positive axial circumduction grind test      Left Hand/Wrist Exam     Inspection   Scars: Wrist - present Hand -  present  Effusion: Wrist - absent Hand -  absent    Pain   Wrist - The patient exhibits pain of the CMC and flexor/pronator group.    Tests   Phalens sign: positive  Tinel's sign (median nerve): positive  Carpal Tunnel Compression Test: positive    Atrophy  Thenar:  Negative  Intrinsic: negative    Other     Sensory Exam  Median Distribution: abnormal  Ulnar Distribution: normal  Radial Distribution: normal    Comments:  The patient has a well-healed carpal tunnel scar.  There is a positive Tinel and positive Phalen sign.  There is no thenar atrophy noted.  She has tenderness basal joint left thumb with a positive axial circumduction grind test          Vascular Exam       Capillary Refill  Right Hand: normal capillary refill  Left Hand: normal capillary refill          Relevant imaging results reviewed and interpreted by me, discussed with the patient and / or family today radiographs both hands showed early osteoarthritic change both thumb basal joints  Assessment:     Encounter Diagnoses   Name Primary?    Carpal tunnel syndrome,  bilateral Yes    Arthritis of carpometacarpal (CMC) joints of both thumbs         Plan:     The patient is injected in both thumb basal joints each with 0.5 cc Kenalog and 0.5 cc 2% plain lidocaine under sterile technique.  She was injected both carpal tunnels each with 1 cc Kenalog and 1 cc 2% plain lidocaine under sterile technique.  She will wait at least 3 months between injections.                Disclaimer: This note was prepared using a voice recognition system and is likely to have sound alike errors within the text.          [1]   Social History  Socioeconomic History    Marital status:    Tobacco Use    Smoking status: Never     Passive exposure: Never    Smokeless tobacco: Never   Substance and Sexual Activity    Alcohol use: Never    Drug use: Never    Sexual activity: Not Currently     Social Drivers of Health     Financial Resource Strain: Medium Risk (11/11/2024)    Overall Financial Resource Strain (CARDIA)     Difficulty of Paying Living Expenses: Somewhat hard   Food Insecurity: No Food Insecurity (1/6/2025)    [facility]    Hunger Vital Sign     Worried About Running Out of Food in the Last Year: Never true     Ran Out of Food in the Last Year: Never true   Recent Concern: Food Insecurity - Food Insecurity Present (11/11/2024)    Hunger Vital Sign     Worried About Running Out of Food in the Last Year: Sometimes true     Ran Out of Food in the Last Year: Sometimes true   Transportation Needs: No Transportation Needs (1/6/2025)    Received from [facility] and Its Subsidiaries and Affiliates    PRAPARE - Transportation     Lack of Transportation (Medical): No     Lack of Transportation (Non-Medical): No   Physical Activity: Unknown (11/11/2024)    Exercise Vital Sign     Days of Exercise per Week: 3 days   Stress: Stress Concern Present (11/11/2024)    [employer] [employer] - Occupational Stress Questionnaire     Feeling of Stress : To some extent   Housing Stability: Low Risk  (1/6/2025)    Received from [facility] and Its Subsidiaries and Affiliates    Housing Stability Vital Sign     Unable to Pay for Housing in the Last Year: No     Number of Times Moved in the Last Year: 1     Homeless in the Last Year: No   Recent Concern: Housing Stability - High Risk (11/11/2024)    Housing Stability Vital Sign     Unable to Pay for Housing in the Last Year: Yes

## 2025-04-15 NOTE — PROCEDURES
Carpal Tunnel: R carpal tunnel, L carpal tunnel    Date/Time: 4/15/2025 11:30 AM    Performed by: Renato Shoemaker MD  Authorized by: Renato Shoemaker MD    Consent Done?:  Yes (Verbal)  Indications:  Pain  Timeout: prior to procedure the correct patient, procedure, and site was verified    Prep: patient was prepped and draped in usual sterile fashion      Local anesthesia used?: Yes    Local anesthetic:  Lidocaine 2% without epinephrine  Anesthetic total (ml):  2    Location:  Wrist  Site:  R carpal tunnel and L carpal tunnel  Ultrasonic Guidance for Needle Placement?: No    Needle size:  25 G  Approach:  Volar  Medications:  40 mg triamcinolone acetonide 40 mg/mL; 40 mg triamcinolone acetonide 40 mg/mL     Physical Therapy Contact Note    Patient Name: Luke Valdez  Age:  70 y.o., Sex:  male  Medical Record #: [id number]  Today's Date: 10/7/2021    Arrived to treat in AM and pt having cardiac challenges (tachycardia and afib) with symptomatic low BP. RN requesting PT f/u later. On PM f/u, pt declining.     Corazon Barker, PT

## 2025-04-25 ENCOUNTER — PATIENT OUTREACH (OUTPATIENT)
Dept: ADMINISTRATIVE | Facility: HOSPITAL | Age: 60
End: 2025-04-25
Payer: COMMERCIAL

## 2025-04-25 NOTE — PROGRESS NOTES
Working mammogram due next 6 months report:    Chart searched  Attempted to contact pt regarding overdue mammogram  Call was answered and hung up before I could say anything  PM sent to pt

## 2025-05-01 ENCOUNTER — OFFICE VISIT (OUTPATIENT)
Dept: PODIATRY | Facility: CLINIC | Age: 60
End: 2025-05-01
Payer: COMMERCIAL

## 2025-05-01 VITALS — HEIGHT: 64 IN | BODY MASS INDEX: 19.64 KG/M2 | WEIGHT: 115.06 LBS

## 2025-05-01 DIAGNOSIS — E11.42 DIABETIC PERIPHERAL NEUROPATHY ASSOCIATED WITH TYPE 2 DIABETES MELLITUS: Primary | ICD-10-CM

## 2025-05-01 DIAGNOSIS — M79.671 PAIN IN RIGHT FOOT: ICD-10-CM

## 2025-05-01 DIAGNOSIS — M79.672 PAIN IN LEFT FOOT: ICD-10-CM

## 2025-05-01 PROCEDURE — 1159F MED LIST DOCD IN RCRD: CPT | Mod: CPTII,S$GLB,, | Performed by: PODIATRIST

## 2025-05-01 PROCEDURE — 99214 OFFICE O/P EST MOD 30 MIN: CPT | Mod: S$GLB,,, | Performed by: PODIATRIST

## 2025-05-01 PROCEDURE — 1160F RVW MEDS BY RX/DR IN RCRD: CPT | Mod: CPTII,S$GLB,, | Performed by: PODIATRIST

## 2025-05-01 PROCEDURE — 3051F HG A1C>EQUAL 7.0%<8.0%: CPT | Mod: CPTII,S$GLB,, | Performed by: PODIATRIST

## 2025-05-01 PROCEDURE — 3008F BODY MASS INDEX DOCD: CPT | Mod: CPTII,S$GLB,, | Performed by: PODIATRIST

## 2025-05-01 PROCEDURE — 4010F ACE/ARB THERAPY RXD/TAKEN: CPT | Mod: CPTII,S$GLB,, | Performed by: PODIATRIST

## 2025-05-01 PROCEDURE — 99999 PR PBB SHADOW E&M-EST. PATIENT-LVL III: CPT | Mod: PBBFAC,,, | Performed by: PODIATRIST

## 2025-05-01 RX ORDER — PREGABALIN 100 MG/1
100 CAPSULE ORAL 2 TIMES DAILY
Qty: 180 CAPSULE | Refills: 1 | Status: SHIPPED | OUTPATIENT
Start: 2025-05-01 | End: 2025-07-30

## 2025-05-01 NOTE — PROGRESS NOTES
Subjective:       Patient ID: Roopa Hicks is a 60 y.o. female.    Chief Complaint: Foot Pain (Pain in both foot, rate pain 8/10, pt is wearing sandals, diabetic pt )    Foot Pain  Associated symptoms include numbness. Pertinent negatives include no chest pain, chills, coughing, fatigue, fever, headaches, nausea, neck pain or vomiting.     Roopa Hicks presents to the office today, with complaints of moderate pains to the LE. She is currently out of her Lyrica 100mg BID. Does have CHF. Was recently started on Bumex and states improved LE edema.     Review of patient's allergies indicates:   Allergen Reactions    Grass pollen-june grass standard Hives       Past Medical History:   Diagnosis Date    Anxiety     Arthritis     Carpal tunnel syndrome, bilateral     Depression     Diabetic peripheral neuropathy associated with type 2 diabetes mellitus     Essential (primary) hypertension     Gastroparesis due to secondary diabetes     Mild intermittent asthma, uncomplicated     Osteopenia     Renal impairment     Shoulder pain, right     Type 2 diabetes mellitus without complications        Family History   Problem Relation Name Age of Onset    Diabetes Mother      Hypertension Mother      Heart disease Mother      Diabetes Father      Hypertension Father      Heart disease Father      Cancer Maternal Grandmother         Social History     Socioeconomic History    Marital status:    Tobacco Use    Smoking status: Never     Passive exposure: Never    Smokeless tobacco: Never   Substance and Sexual Activity    Alcohol use: Never    Drug use: Never    Sexual activity: Not Currently     Social Drivers of Health     Financial Resource Strain: Medium Risk (11/11/2024)    Overall Financial Resource Strain (CARDIA)     Difficulty of Paying Living Expenses: Somewhat hard   Food Insecurity: No Food Insecurity (1/6/2025)    Received from [facility] and Its Subsidiaries and  Affiliates    Hunger Vital Sign     Worried About Running Out of Food in the Last Year: Never true     Ran Out of Food in the Last Year: Never true   Recent Concern: Food Insecurity - Food Insecurity Present (2024)    Hunger Vital Sign     Worried About Running Out of Food in the Last Year: Sometimes true     Ran Out of Food in the Last Year: Sometimes true   Transportation Needs: No Transportation Needs (2025)    Received from [facility] and Its Subsidiaries and Affiliates    PRAPARE - Transportation     Lack of Transportation (Medical): No     Lack of Transportation (Non-Medical): No   Physical Activity: Unknown (2024)    Exercise Vital Sign     Days of Exercise per Week: 3 days   Stress: Stress Concern Present (2024)    [employer] - Occupational Stress Questionnaire     Feeling of Stress : To some extent   Housing Stability: Low Risk  (2025)    Received from [facility] and Its Subsidiaries and Affiliates    Housing Stability Vital Sign     Unable to Pay for Housing in the Last Year: No     Number of Times Moved in the Last Year: 1     Homeless in the Last Year: No   Recent Concern: Housing Stability - High Risk (2024)    Housing Stability Vital Sign     Unable to Pay for Housing in the Last Year: Yes       Past Surgical History:   Procedure Laterality Date    CARPAL TUNNEL RELEASE Left 2024    Procedure: RELEASE, CARPAL TUNNEL;  Surgeon: Renato Shoemaker MD;  Location: [facility];  Service: Orthopedics;  Laterality: Left;  left carpal tunnel release     SECTION      HYSTERECTOMY      INSERTION OF PACEMAKER Bilateral 2021    NEPHRECTOMY Left        Review of Systems   Constitutional:  Negative for chills, fatigue and fever.   HENT:  Negative for hearing loss.    Eyes:  Negative for photophobia and visual disturbance.   Respiratory:  Negative for cough,  "chest tightness, shortness of breath and wheezing.    Cardiovascular:  Positive for leg swelling. Negative for chest pain and palpitations.   Gastrointestinal:  Negative for constipation, diarrhea, nausea and vomiting.   Endocrine: Negative for cold intolerance and heat intolerance.   Genitourinary:  Negative for flank pain.   Musculoskeletal:  Negative for neck pain and neck stiffness.   Neurological:  Positive for numbness. Negative for light-headedness and headaches.        Neuritis    Psychiatric/Behavioral:  Negative for sleep disturbance.        Objective:   Ht 5' 4" (1.626 m)   Wt 52.2 kg (115 lb 1.3 oz)   BMI 19.75 kg/m²     Physical Exam  LOWER EXTREMITY PHYSICAL EXAMINATION  DERMATOLOGY: Skin is supple, dry and intact.    NEUROLOGY: Sensation to light touch is intact. Proprioception is intact, bilateral. Sensation to pin prick is reduced to absent. Vibratory sensation is diminished to the left and right lower extremity. Examination with 5.07 [place] Rasheed monofilament reveals that protective sensation is not intact to the left and right plantar surfaces of the foot and digits, as the patient has no sensation/detection at greater than 4 distinct points of contact.     VASCULAR: The B/L dorsalis pedis pulse is 2/4 and the posterior tibial pulse is 2/4. Hair growth is noted on the dorsal foot and digits. Proximal to distal, warm to warm. Capillary refill time is WNL at less than 3s. Edema is noted, 1+.     Assessment:     1. Diabetic peripheral neuropathy associated with type 2 diabetes mellitus    2. Pain in right foot    3. Pain in left foot        Plan:     Diabetic peripheral neuropathy associated with type 2 diabetes mellitus  -     pregabalin (LYRICA) 100 MG capsule; Take 1 capsule (100 mg total) by mouth 2 (two) times daily.  Dispense: 180 capsule; Refill: 1    Pain in right foot  -     pregabalin (LYRICA) 100 MG capsule; Take 1 capsule (100 mg total) by mouth 2 (two) times daily.  Dispense: 180 " capsule; Refill: 1    Pain in left foot  -     pregabalin (LYRICA) 100 MG capsule; Take 1 capsule (100 mg total) by mouth 2 (two) times daily.  Dispense: 180 capsule; Refill: 1          Future Appointments   Date Time Provider Department Center   7/15/2025 10:15 AM Renato Shoemaker MD ON ORTHO  [facility]   9/12/2025  8:40 AM Camille Pablo MD [facility]

## 2025-05-07 ENCOUNTER — DOCUMENT SCAN (OUTPATIENT)
Dept: HOME HEALTH SERVICES | Facility: HOSPITAL | Age: 60
End: 2025-05-07
Payer: COMMERCIAL

## 2025-06-04 ENCOUNTER — PATIENT OUTREACH (OUTPATIENT)
Dept: ADMINISTRATIVE | Facility: HOSPITAL | Age: 60
End: 2025-06-04
Payer: COMMERCIAL

## 2025-06-04 VITALS — DIASTOLIC BLOOD PRESSURE: 82 MMHG | SYSTOLIC BLOOD PRESSURE: 118 MMHG

## 2025-06-10 ENCOUNTER — LAB VISIT (OUTPATIENT)
Dept: LAB | Facility: HOSPITAL | Age: 60
End: 2025-06-10
Attending: INTERNAL MEDICINE
Payer: COMMERCIAL

## 2025-06-10 ENCOUNTER — OFFICE VISIT (OUTPATIENT)
Dept: PRIMARY CARE CLINIC | Facility: CLINIC | Age: 60
End: 2025-06-10
Payer: COMMERCIAL

## 2025-06-10 VITALS
OXYGEN SATURATION: 99 % | WEIGHT: 112.44 LBS | TEMPERATURE: 97 F | BODY MASS INDEX: 19.3 KG/M2 | HEART RATE: 79 BPM | DIASTOLIC BLOOD PRESSURE: 60 MMHG | SYSTOLIC BLOOD PRESSURE: 120 MMHG

## 2025-06-10 DIAGNOSIS — B37.2 YEAST DERMATITIS: ICD-10-CM

## 2025-06-10 DIAGNOSIS — L85.3 XEROSIS OF SKIN: ICD-10-CM

## 2025-06-10 DIAGNOSIS — E11.42 DIABETIC PERIPHERAL NEUROPATHY ASSOCIATED WITH TYPE 2 DIABETES MELLITUS: ICD-10-CM

## 2025-06-10 DIAGNOSIS — Z87.09 HISTORY OF ASTHMA: ICD-10-CM

## 2025-06-10 DIAGNOSIS — Z12.31 BREAST CANCER SCREENING BY MAMMOGRAM: ICD-10-CM

## 2025-06-10 DIAGNOSIS — Z09 FOLLOW-UP EXAM: Primary | ICD-10-CM

## 2025-06-10 DIAGNOSIS — M25.50 ARTHRALGIA, UNSPECIFIED JOINT: ICD-10-CM

## 2025-06-10 LAB
EAG (OHS): 146 MG/DL (ref 68–131)
HBA1C MFR BLD: 6.7 % (ref 4–5.6)

## 2025-06-10 PROCEDURE — 83036 HEMOGLOBIN GLYCOSYLATED A1C: CPT

## 2025-06-10 PROCEDURE — 36415 COLL VENOUS BLD VENIPUNCTURE: CPT | Mod: PN

## 2025-06-10 PROCEDURE — 1159F MED LIST DOCD IN RCRD: CPT | Mod: CPTII,S$GLB,, | Performed by: INTERNAL MEDICINE

## 2025-06-10 PROCEDURE — 1160F RVW MEDS BY RX/DR IN RCRD: CPT | Mod: CPTII,S$GLB,, | Performed by: INTERNAL MEDICINE

## 2025-06-10 PROCEDURE — 99214 OFFICE O/P EST MOD 30 MIN: CPT | Mod: S$GLB,,, | Performed by: INTERNAL MEDICINE

## 2025-06-10 PROCEDURE — 4010F ACE/ARB THERAPY RXD/TAKEN: CPT | Mod: CPTII,S$GLB,, | Performed by: INTERNAL MEDICINE

## 2025-06-10 PROCEDURE — 3051F HG A1C>EQUAL 7.0%<8.0%: CPT | Mod: CPTII,S$GLB,, | Performed by: INTERNAL MEDICINE

## 2025-06-10 PROCEDURE — 99999 PR PBB SHADOW E&M-EST. PATIENT-LVL III: CPT | Mod: PBBFAC,,, | Performed by: INTERNAL MEDICINE

## 2025-06-10 PROCEDURE — 3078F DIAST BP <80 MM HG: CPT | Mod: CPTII,S$GLB,, | Performed by: INTERNAL MEDICINE

## 2025-06-10 PROCEDURE — 3074F SYST BP LT 130 MM HG: CPT | Mod: CPTII,S$GLB,, | Performed by: INTERNAL MEDICINE

## 2025-06-10 PROCEDURE — 3008F BODY MASS INDEX DOCD: CPT | Mod: CPTII,S$GLB,, | Performed by: INTERNAL MEDICINE

## 2025-06-10 RX ORDER — LANOLIN ALCOHOL/MO/W.PET/CERES
CREAM (GRAM) TOPICAL
Qty: 113 G | Refills: 5 | Status: SHIPPED | OUTPATIENT
Start: 2025-06-10

## 2025-06-10 RX ORDER — DICLOFENAC SODIUM 10 MG/G
GEL TOPICAL 2 TIMES DAILY
Qty: 300 G | Refills: 5 | Status: SHIPPED | OUTPATIENT
Start: 2025-06-10

## 2025-06-10 RX ORDER — ALBUTEROL SULFATE 90 UG/1
2 INHALANT RESPIRATORY (INHALATION) 4 TIMES DAILY PRN
Qty: 18 G | Refills: 2 | Status: SHIPPED | OUTPATIENT
Start: 2025-06-10

## 2025-06-10 RX ORDER — KETOCONAZOLE 20 MG/G
CREAM TOPICAL 2 TIMES DAILY
Qty: 30 G | Refills: 2 | Status: SHIPPED | OUTPATIENT
Start: 2025-06-10

## 2025-06-10 NOTE — PROGRESS NOTES
Subjective     Patient ID: Roopa Hicks is a 60 y.o. female.    Chief Complaint: Follow-up      History of Present Illness    CHIEF COMPLAINT:  Ms. Hicks presents today for diabetes follow-up    DIABETES MANAGEMENT:  Her Otno device is changing according to her [facility] pharmacy, though specific changes are unknown.    SKIN CONCERNS:  She reports a yeast infection in her navel area with associated odor noticeable to family members. She has cracked and painful feet causing significant discomfort with ambulation. Previously effective diabetic lotion no longer provides relief. She experiences persistent swelling despite Bumex use and inquires about twice daily dosing. Associated with the swelling, she notes skin peeling that correlates with swelling fluctuations - skin stretches when swollen and becomes shriveled when swelling subsides. She reports burning sensation in affected areas.    RESPIRATORY:  She experiences asthma symptoms, particularly when outdoors, with worsening in the evening. These exacerbations are causing anxiety about sleep. She also reports allergy symptoms with significant eye irritation and unspecified ear changes.    GASTROINTESTINAL:  She reports good results with Linzess without abdominal pain. She notes medication side effects including urgency and potential fecal incontinence, particularly when passing gas, but is learning to manage these effects.      ROS:  General: -fever, -chills, -fatigue, -weight gain, -weight loss  Eyes: -vision changes, -redness, -discharge, +eye itchiness  ENT: +ear pain, -nasal congestion, -sore throat  Cardiovascular: -chest pain, -palpitations, +lower extremity edema  Respiratory: -cough, -shortness of breath, +wheezing  Gastrointestinal: -abdominal pain, -nausea, -vomiting, -diarrhea, -constipation, -blood in stool  Genitourinary: -dysuria, -hematuria, -frequency  Musculoskeletal: -joint pain, -muscle pain, +muscle weakness, +weakness, +neck pain,  +falling, +limb pain, +pain with movement  Skin: -rash, -lesion, +burning sensation  Neurological: -headache, +dizziness, -numbness, -tingling  Psychiatric: +anxiety, -depression, -sleep difficulty  Female Genitourinary: +vaginal itching or burning, +vulvar itching or burning, +vaginal odor          Past Medical History:   Diagnosis Date    Anxiety     Arthritis     Carpal tunnel syndrome, bilateral     Depression     Diabetic peripheral neuropathy associated with type 2 diabetes mellitus     Essential (primary) hypertension     Gastroparesis due to secondary diabetes     Mild intermittent asthma, uncomplicated     Osteopenia     Renal impairment     Shoulder pain, right     Type 2 diabetes mellitus without complications      Review of patient's allergies indicates:   Allergen Reactions    Grass pollen- grass standard Hives     Past Surgical History:   Procedure Laterality Date    CARPAL TUNNEL RELEASE Left 2024    Procedure: RELEASE, CARPAL TUNNEL;  Surgeon: Renato Shoemaker MD;  Location: [facility];  Service: Orthopedics;  Laterality: Left;  left carpal tunnel release     SECTION      HYSTERECTOMY      INSERTION OF PACEMAKER Bilateral 2021    NEPHRECTOMY Left      Family History   Problem Relation Name Age of Onset    Diabetes Mother      Hypertension Mother      Heart disease Mother      Diabetes Father      Hypertension Father      Heart disease Father      Cancer Maternal Grandmother       Social History[1]      /60 (BP Location: Right arm)   Pulse 79   Temp 97 °F (36.1 °C) (Tympanic)   Wt 51 kg (112 lb 7 oz)   SpO2 99%   BMI 19.30 kg/m²   Outpatient Medications as of 6/10/2025   Medication Sig Dispense Refill    ACCU-CHEK KACI PLUS TEST STRP Strp USE TO TEST BLOOD SUGAR  each 1    ACCU-CHEK SOFTCLIX LANCETS Misc TEST BLOOD SUGAR  each 1    atorvastatin (LIPITOR) 40 MG tablet Take 40 mg by mouth once daily.      blood-glucose sensor (FREESTYLE ASTER 3  "SENSOR) Tati CHANGE SENSOR EVERY 14     DAYS. 6 each 3    cholecalciferol, vitamin D3, 1,250 mcg (50,000 unit) capsule Take 1 capsule (50,000 Units total) by mouth once a week. 12 capsule 1    empagliflozin (JARDIANCE) 10 mg tablet Take 1 tablet (10 mg total) by mouth once daily. 90 tablet 3    furosemide (LASIX) 40 MG tablet 1 daily as needed per guidelines 90 tablet 1    gabapentin (NEURONTIN) 600 MG tablet Take 600 mg by mouth 3 (three) times daily as needed.      glipiZIDE (GLUCOTROL) 2.5 MG TR24 Take 2.5 mg by mouth daily with breakfast.      HYDROcodone-acetaminophen (NORCO) 5-325 mg per tablet Take 1 tablet by mouth every 6 (six) hours as needed for Pain. 15 tablet 0    ivabradine (CORLANOR) 5 mg Tab Take 1 tablet by mouth 2 (two) times daily.      linaCLOtide (LINZESS) 72 mcg Cap capsule Take 1 capsule (72 mcg total) by mouth before breakfast. 30 capsule 11    metoprolol succinate (TOPROL-XL) 25 MG 24 hr tablet Take 1 tablet (25 mg total) by mouth every morning. 90 tablet 3    mirtazapine (REMERON) 30 MG tablet Take 1 tablet (30 mg total) by mouth every evening. 90 tablet 3    mupirocin (BACTROBAN) 2 % ointment Apply topically 3 (three) times daily. 60 g 2    nebulizer and compressor Tati Use every 4-6 hrs prn for wheezing 1 each 0    neomycin-polymyxin-dexamethasone (MAXITROL) 3.5mg/mL-10,000 unit/mL-0.1 % DrpS Place 1 drop into both eyes every 8 (eight) hours.      olopatadine (PATANOL) 0.1 % ophthalmic solution Place 1 drop into both eyes 2 (two) times daily. 5 mL 5    ondansetron (ZOFRAN-ODT) 8 MG TbDL Take 1 tablet (8 mg total) by mouth every 12 (twelve) hours as needed (nausea). 30 tablet 11    pen needle, diabetic (BD ULTRA-FINE POLO PEN NEEDLE) 32 gauge x 5/32" Ndle USE ONCE DAILY WITH BASAGLAR 300 each 1    pregabalin (LYRICA) 100 MG capsule Take 1 capsule (100 mg total) by mouth 2 (two) times daily. 180 capsule 1    sacubitriL-valsartan (ENTRESTO) 24-26 mg per tablet Take 1 tablet by mouth 2 (two) " times daily. 180 tablet 3    spironolactone (ALDACTONE) 25 MG tablet Take 1 tablet (25 mg total) by mouth every morning. 90 tablet 3    tirzepatide (MOUNJARO) 10 mg/0.5 mL PnIj Inject 10 mg into the skin every 7 days. 24 Pen 1    triamcinolone acetonide 0.1% (KENALOG) 0.1 % ointment Apply topically 2 (two) times daily.      albuterol (PROVENTIL/VENTOLIN HFA) 90 mcg/actuation inhaler Inhale 2 puffs into the lungs 4 (four) times daily as needed for Wheezing or Shortness of Breath. 18 g 2    bumetanide (BUMEX) 1 MG tablet Take 1 mg by mouth daily as needed.      diclofenac sodium (VOLTAREN) 1 % Gel Apply topically 2 (two) times daily. 300 g 5    insulin lispro (HUMALOG KWIKPEN INSULIN) 100 unit/mL pen Inject 10 Units into the skin 3 (three) times daily. 27 mL 3    ketoconazole (NIZORAL) 2 % cream Apply topically 2 (two) times daily. 30 g 2    traMADol (ULTRAM) 50 mg tablet TK 1 T PO  BID (Patient taking differently: Take 50 mg by mouth once daily.) 45 tablet 1     No current facility-administered medications on file as of 6/10/2025.              Objective     Physical Exam    General: No acute distress. Well-developed. Well-nourished.  Eyes: EOMI. Sclerae anicteric.  HENT: Normocephalic. Atraumatic. Nares patent. Moist oral mucosa.  Cardiovascular: Regular rate. Regular rhythm. No rubs. No gallops. Normal S1, S2.  Respiratory: Normal respiratory effort. Clear to auscultation bilaterally. No rales. No rhonchi. Wheezing.  Abdomen: Soft.   Musculoskeletal: No  obvious deformity.  Extremities: mild np lower extremity edema.   Neurological: Alert & oriented x3. No slurred speech. Normal gait.  Psychiatric: Normal mood. Normal affect. Good insight. Good judgment.  Skin: Warm. Dry. No rash. Skin cracking and peeling. Cracked toes.             Assessment and Plan     1. Follow-up exam    2. Yeast dermatitis  -     ketoconazole (NIZORAL) 2 % cream; Apply topically 2 (two) times daily.  Dispense: 30 g; Refill: 2    3. Diabetic  peripheral neuropathy associated with type 2 diabetes mellitus  Overview:  Continue to optimize diabetic control    Orders:  -     Hemoglobin A1C; Future; Expected date: 06/10/2025    4. History of asthma  -     albuterol (PROVENTIL/VENTOLIN HFA) 90 mcg/actuation inhaler; Inhale 2 puffs into the lungs 4 (four) times daily as needed for Wheezing or Shortness of Breath.  Dispense: 18 g; Refill: 2    5. Arthralgia, unspecified joint  -     diclofenac sodium (VOLTAREN) 1 % Gel; Apply topically 2 (two) times daily.  Dispense: 300 g; Refill: 5    6. Breast cancer screening by mammogram  -     Mammo Digital Screening Bilat w/ Lorenzo (XPD); Future; Expected date: 06/10/2025    7. Xerosis of skin  -     lanolin alcohol-mineral oil-white petrolatum-ceres (MINERIN CREME) Crea cream; Apply topically as needed (to bilateral feet).  Dispense: 113 g; Refill: 5         Follow up in about 6 months (around 12/10/2025).    Immunization History   Administered Date(s) Administered    COVID-19, MRNA, LN-S, PF (MODERNA FULL 0.5 ML DOSE) 02/27/2021, 02/27/2021, 02/27/2021, 03/29/2021, 03/29/2021, 08/17/2021, 08/17/2021, 07/09/2022    COVID-19, mRNA, LNP-S, PF (Moderna) Ages 12+ 01/23/2024    Influenza - Quadrivalent 10/09/2020    Influenza - Quadrivalent - PF *Preferred* (6 months and older) 10/23/2019, 10/09/2020, 11/30/2021, 10/07/2022, 10/06/2023    Pneumococcal Polysaccharide - 23 Valent 05/05/2023    Td (ADULT) 05/19/2005    Tdap 05/19/2005, 05/19/2005, 07/10/2020       I spent a total of 30 minutes on the day of the visit.This includes face to face time and non-face to face time preparing to see the patient (eg, review of tests), obtaining and/or reviewing separately obtained history, documenting clinical information in the electronic or other health record, independently interpreting results and communicating results to the patient/family/caregiver, or care coordinator.         This note was generated with the assistance of ambient  listening technology. Verbal consent was obtained by the patient and accompanying visitor(s) for the recording of patient appointment to facilitate this note. I attest to having reviewed and edited the generated note for accuracy, though some syntax or spelling errors may persist. Please contact the author of this note for any clarification.           [1]   Social History  Socioeconomic History    Marital status:    Tobacco Use    Smoking status: Never     Passive exposure: Never    Smokeless tobacco: Never   Substance and Sexual Activity    Alcohol use: Never    Drug use: Never    Sexual activity: Not Currently     Social Drivers of Health     Financial Resource Strain: Medium Risk (11/11/2024)    Overall Financial Resource Strain (CARDIA)     Difficulty of Paying Living Expenses: Somewhat hard   Food Insecurity: No Food Insecurity (1/6/2025)    Received from [facility] and Its Subsidiaries and Affiliates    Hunger Vital Sign     Worried About Running Out of Food in the Last Year: Never true     Ran Out of Food in the Last Year: Never true   Recent Concern: Food Insecurity - Food Insecurity Present (11/11/2024)    Hunger Vital Sign     Worried About Running Out of Food in the Last Year: Sometimes true     Ran Out of Food in the Last Year: Sometimes true   Transportation Needs: No Transportation Needs (1/6/2025)    Received from [facility] and Its Subsidiaries and Affiliates    PRAPARE - Transportation     Lack of Transportation (Medical): No     Lack of Transportation (Non-Medical): No   Physical Activity: Unknown (11/11/2024)    Exercise Vital Sign     Days of Exercise per Week: 3 days   Stress: Stress Concern Present (11/11/2024)    [employer] - Occupational Stress Questionnaire     Feeling of Stress : To some extent   Housing Stability: Low Risk  (1/6/2025)    [facility] and Its Subsidiaries and Affiliates    Housing Stability Vital Sign     Unable to Pay for Housing in the Last Year: No     Number of Times Moved in the Last Year: 1     Homeless in the Last Year: No   Recent Concern: Housing Stability - High Risk (11/11/2024)    Housing Stability Vital Sign     Unable to Pay for Housing in the Last Year: Yes

## 2025-06-12 ENCOUNTER — RESULTS FOLLOW-UP (OUTPATIENT)
Dept: PRIMARY CARE CLINIC | Facility: CLINIC | Age: 60
End: 2025-06-12

## 2025-06-26 ENCOUNTER — EXTERNAL HOME HEALTH (OUTPATIENT)
Dept: HOME HEALTH SERVICES | Facility: HOSPITAL | Age: 60
End: 2025-06-26
Payer: COMMERCIAL

## 2025-07-26 ENCOUNTER — OFFICE VISIT (OUTPATIENT)
Dept: INTERNAL MEDICINE | Facility: CLINIC | Age: 60
End: 2025-07-26
Payer: COMMERCIAL

## 2025-07-26 VITALS
DIASTOLIC BLOOD PRESSURE: 68 MMHG | RESPIRATION RATE: 18 BRPM | HEIGHT: 64 IN | TEMPERATURE: 97 F | HEART RATE: 78 BPM | SYSTOLIC BLOOD PRESSURE: 120 MMHG | WEIGHT: 110.88 LBS | OXYGEN SATURATION: 99 % | BODY MASS INDEX: 18.93 KG/M2

## 2025-07-26 DIAGNOSIS — K59.01 SLOW TRANSIT CONSTIPATION: ICD-10-CM

## 2025-07-26 DIAGNOSIS — Z23 NEED FOR PNEUMOCOCCAL VACCINATION: ICD-10-CM

## 2025-07-26 DIAGNOSIS — Z09 FOLLOW-UP EXAM: Primary | ICD-10-CM

## 2025-07-26 DIAGNOSIS — B37.2 YEAST DERMATITIS: ICD-10-CM

## 2025-07-26 DIAGNOSIS — E43 SEVERE PROTEIN-CALORIE MALNUTRITION: ICD-10-CM

## 2025-07-26 DIAGNOSIS — L85.3 XEROSIS CUTIS: ICD-10-CM

## 2025-07-26 DIAGNOSIS — H10.13 ALLERGIC CONJUNCTIVITIS OF BOTH EYES: ICD-10-CM

## 2025-07-26 PROCEDURE — 1160F RVW MEDS BY RX/DR IN RCRD: CPT | Mod: CPTII,S$GLB,, | Performed by: INTERNAL MEDICINE

## 2025-07-26 PROCEDURE — 99999 PR PBB SHADOW E&M-EST. PATIENT-LVL V: CPT | Mod: PBBFAC,,, | Performed by: INTERNAL MEDICINE

## 2025-07-26 PROCEDURE — G2211 COMPLEX E/M VISIT ADD ON: HCPCS | Mod: S$GLB,,, | Performed by: INTERNAL MEDICINE

## 2025-07-26 PROCEDURE — 3074F SYST BP LT 130 MM HG: CPT | Mod: CPTII,S$GLB,, | Performed by: INTERNAL MEDICINE

## 2025-07-26 PROCEDURE — 1159F MED LIST DOCD IN RCRD: CPT | Mod: CPTII,S$GLB,, | Performed by: INTERNAL MEDICINE

## 2025-07-26 PROCEDURE — 3078F DIAST BP <80 MM HG: CPT | Mod: CPTII,S$GLB,, | Performed by: INTERNAL MEDICINE

## 2025-07-26 PROCEDURE — 3008F BODY MASS INDEX DOCD: CPT | Mod: CPTII,S$GLB,, | Performed by: INTERNAL MEDICINE

## 2025-07-26 PROCEDURE — 4010F ACE/ARB THERAPY RXD/TAKEN: CPT | Mod: CPTII,S$GLB,, | Performed by: INTERNAL MEDICINE

## 2025-07-26 PROCEDURE — 3044F HG A1C LEVEL LT 7.0%: CPT | Mod: CPTII,S$GLB,, | Performed by: INTERNAL MEDICINE

## 2025-07-26 PROCEDURE — 99214 OFFICE O/P EST MOD 30 MIN: CPT | Mod: S$GLB,,, | Performed by: INTERNAL MEDICINE

## 2025-07-26 RX ORDER — OLOPATADINE HYDROCHLORIDE 1 MG/ML
1 SOLUTION OPHTHALMIC 2 TIMES DAILY
Qty: 5 ML | Refills: 5 | Status: SHIPPED | OUTPATIENT
Start: 2025-07-26

## 2025-07-26 RX ORDER — FLUCONAZOLE 150 MG/1
150 TABLET ORAL DAILY
Qty: 3 TABLET | Refills: 1 | Status: SHIPPED | OUTPATIENT
Start: 2025-07-26 | End: 2025-07-29

## 2025-07-26 RX ORDER — KETOCONAZOLE 20 MG/G
CREAM TOPICAL
Qty: 60 G | Refills: 5 | Status: SHIPPED | OUTPATIENT
Start: 2025-07-26

## 2025-07-26 RX ORDER — ALBUTEROL SULFATE 0.83 MG/ML
2.5 SOLUTION RESPIRATORY (INHALATION) 4 TIMES DAILY PRN
COMMUNITY

## 2025-07-26 NOTE — PROGRESS NOTES
"Subjective     Patient ID: Roopa Hicks is a 60 y.o. female.    Chief Complaint: Rash, Edema, Vaginitis, and Skin Discoloration      History of Present Illness           Here for refills.  Wants to get Prevnar but when she returns from a family trip to [place].  C/o vaginitis, yeast.  States blood glucose has been well controlled.  Also sees GI and Linzess 72mg bid works well for her.  The 145mg dose once daily gave her diarrhea.  Otherwise, things have been stable.  Her  has been doing fairly well. C/o ongoing chronic dry skin especially on extremities.    Past Medical History:   Diagnosis Date    Anxiety     Arthritis     Carpal tunnel syndrome, bilateral     Depression     Diabetic peripheral neuropathy associated with type 2 diabetes mellitus     Essential (primary) hypertension     Gastroparesis due to secondary diabetes     Mild intermittent asthma, uncomplicated     Osteopenia     Renal impairment     Shoulder pain, right     Type 2 diabetes mellitus without complications      Review of patient's allergies indicates:   Allergen Reactions    Grass pollen- grass standard Hives     Past Surgical History:   Procedure Laterality Date    CARPAL TUNNEL RELEASE Left 2024    Procedure: RELEASE, CARPAL TUNNEL;  Surgeon: Renato Shoemaker MD;  Location: [facility];  Service: Orthopedics;  Laterality: Left;  left carpal tunnel release     SECTION      HYSTERECTOMY      INSERTION OF PACEMAKER Bilateral 2021    NEPHRECTOMY Left      Family History   Problem Relation Name Age of Onset    Diabetes Mother      Hypertension Mother      Heart disease Mother      Diabetes Father      Hypertension Father      Heart disease Father      Cancer Maternal Grandmother       Social History[1]      /68   Pulse 78   Temp 96.8 °F (36 °C) (Tympanic)   Resp 18   Ht 5' 4" (1.626 m)   Wt 50.3 kg (110 lb 14.3 oz)   SpO2 99%   BMI 19.03 kg/m²   Outpatient Medications as of 2025   Medication " Sig Dispense Refill    ACCU-CHEK KACI PLUS TEST STRP Strp USE TO TEST BLOOD SUGAR  each 1    ACCU-CHEK SOFTCLIX LANCETS Misc TEST BLOOD SUGAR  each 1    albuterol (PROVENTIL/VENTOLIN HFA) 90 mcg/actuation inhaler Inhale 2 puffs into the lungs 4 (four) times daily as needed for Wheezing or Shortness of Breath. 18 g 2    atorvastatin (LIPITOR) 40 MG tablet Take 40 mg by mouth once daily.      blood-glucose sensor (FREESTYLE ASTER 3 SENSOR) Tati CHANGE SENSOR EVERY 14     DAYS. 6 each 3    cholecalciferol, vitamin D3, 1,250 mcg (50,000 unit) capsule Take 1 capsule (50,000 Units total) by mouth once a week. 12 capsule 1    diclofenac sodium (VOLTAREN) 1 % Gel Apply topically 2 (two) times daily. 300 g 5    empagliflozin (JARDIANCE) 10 mg tablet Take 1 tablet (10 mg total) by mouth once daily. 90 tablet 3    furosemide (LASIX) 40 MG tablet 1 daily as needed per guidelines 90 tablet 1    gabapentin (NEURONTIN) 600 MG tablet Take 600 mg by mouth 3 (three) times daily as needed.      glipiZIDE (GLUCOTROL) 2.5 MG TR24 Take 2.5 mg by mouth daily with breakfast.      HYDROcodone-acetaminophen (NORCO) 5-325 mg per tablet Take 1 tablet by mouth every 6 (six) hours as needed for Pain. 15 tablet 0    ivabradine (CORLANOR) 5 mg Tab Take 1 tablet by mouth 2 (two) times daily.      ketoconazole (NIZORAL) 2 % cream Apply topically 2 (two) times daily. 30 g 2    lanolin alcohol-mineral oil-white petrolatum-ceres (MINERIN CREME) Crea cream Apply topically as needed (to bilateral feet). 113 g 5    LANOLIN ALCOHOLS, BULK, MISC Apply topically.      metoprolol succinate (TOPROL-XL) 25 MG 24 hr tablet Take 1 tablet (25 mg total) by mouth every morning. 90 tablet 3    mirtazapine (REMERON) 30 MG tablet Take 1 tablet (30 mg total) by mouth every evening. 90 tablet 3    mupirocin (BACTROBAN) 2 % ointment Apply topically 3 (three) times daily. 60 g 2    nebulizer and compressor Tati Use every 4-6 hrs prn for wheezing 1 each 0     "neomycin-polymyxin-dexamethasone (MAXITROL) 3.5mg/mL-10,000 unit/mL-0.1 % DrpS Place 1 drop into both eyes every 8 (eight) hours.      ondansetron (ZOFRAN-ODT) 8 MG TbDL Take 1 tablet (8 mg total) by mouth every 12 (twelve) hours as needed (nausea). 30 tablet 11    pen needle, diabetic (BD ULTRA-FINE POLO PEN NEEDLE) 32 gauge x 5/32" Ndle USE ONCE DAILY WITH BASAGLAR 300 each 1    pregabalin (LYRICA) 100 MG capsule Take 1 capsule (100 mg total) by mouth 2 (two) times daily. 180 capsule 1    sacubitriL-valsartan (ENTRESTO) 24-26 mg per tablet Take 1 tablet by mouth 2 (two) times daily. 180 tablet 3    spironolactone (ALDACTONE) 25 MG tablet Take 1 tablet (25 mg total) by mouth every morning. 90 tablet 3    tirzepatide (MOUNJARO) 10 mg/0.5 mL PnIj Inject 10 mg into the skin every 7 days. 24 Pen 1    traMADol (ULTRAM) 50 mg tablet TK 1 T PO  BID (Patient taking differently: Take 50 mg by mouth once daily.) 45 tablet 1    triamcinolone acetonide 0.1% (KENALOG) 0.1 % ointment Apply topically 2 (two) times daily.      bumetanide (BUMEX) 1 MG tablet Take 1 mg by mouth daily as needed.      insulin lispro (HUMALOG KWIKPEN INSULIN) 100 unit/mL pen Inject 10 Units into the skin 3 (three) times daily. 27 mL 3    olopatadine (PATANOL) 0.1 % ophthalmic solution Place 1 drop into both eyes 2 (two) times daily. 5 mL 5     Facility-Administered Medications as of 7/26/2025   Medication Dose Route Frequency Provider Last Rate Last Admin    [START ON 8/1/2025] pneumoc 20-summer conj-dip cr(PF) (PREVNAR-20 (PF)) injection Syrg 0.5 mL  0.5 mL Intramuscular 1 time in Clinic/HOD                   Objective     Physical Exam      A&O  Nad  Bmi 19  No c/c/e  Thin extremities  Rr  No resp distress  Mood pleasant and appropriate   Xerosis of skin            Assessment and Plan     1. Follow-up exam    2. Slow transit constipation  -     linaCLOtide (LINZESS) 72 mcg Cap capsule; Take 1 capsule PO BID  Dispense: 60 capsule; Refill: 11    3. " Xerosis cutis  Comments:  lubricating and ceramide lotions d/w pt    4. Yeast dermatitis  -     fluconazole (DIFLUCAN) 150 MG Tab; Take 1 tablet (150 mg total) by mouth once daily. for 3 days  Dispense: 3 tablet; Refill: 1  -     ketoconazole (NIZORAL) 2 % cream; Use BID as needed for yeast rash  Dispense: 60 g; Refill: 5    5. Allergic conjunctivitis of both eyes  -     olopatadine (PATANOL) 0.1 % ophthalmic solution; Place 1 drop into both eyes 2 (two) times daily.  Dispense: 5 mL; Refill: 5    6. Need for pneumococcal vaccination  -     pneumoc 20-summer conj-dip cr(PF) (PREVNAR-20 (PF)) injection Syrg 0.5 mL    7. Severe protein-calorie malnutrition  Comments:  mainitaining lately; has worked with nutrition; water aerobics at least 3 x weekly; protein shakes         Prevnar when ready     Immunization History   Administered Date(s) Administered    COVID-19, MRNA, LN-S, PF (MODERNA FULL 0.5 ML DOSE) 02/27/2021, 02/27/2021, 02/27/2021, 03/29/2021, 03/29/2021, 08/17/2021, 08/17/2021, 07/09/2022    COVID-19, mRNA, LNP-S, PF (Moderna) Ages 12+ 01/23/2024    Influenza - Quadrivalent 10/09/2020    Influenza - Quadrivalent - PF *Preferred* (6 months and older) 10/23/2019, 10/09/2020, 11/30/2021, 10/07/2022, 10/06/2023    Pneumococcal Polysaccharide - 23 Valent 05/05/2023    Td (ADULT) 05/19/2005    Tdap 05/19/2005, 05/19/2005, 07/10/2020       Visit today included increased complexity associated with the care of the episodic problem xerosis of skin addressed and managing the longitudinal care of the patient due to the serious and/or complex managed problem(s) protein malnutrition.    I spent a total of 30 minutes on the day of the visit.This includes face to face time and non-face to face time preparing to see the patient (eg, review of tests), obtaining and/or reviewing separately obtained history, documenting clinical information in the electronic or other health record, independently interpreting results and  communicating results to the patient/family/caregiver, or care coordinator.         This note was generated with the assistance of ambient listening technology. Verbal consent was obtained by the patient and accompanying visitor(s) for the recording of patient appointment to facilitate this note. I attest to having reviewed and edited the generated note for accuracy, though some syntax or spelling errors may persist. Please contact the author of this note for any clarification.           [1]   Social History  Socioeconomic History    Marital status:    Tobacco Use    Smoking status: Never     Passive exposure: Never    Smokeless tobacco: Never   Substance and Sexual Activity    Alcohol use: Never    Drug use: Never    Sexual activity: Not Currently     Social Drivers of Health     Financial Resource Strain: Medium Risk (11/11/2024)    Overall Financial Resource Strain (CARDIA)     Difficulty of Paying Living Expenses: Somewhat hard   Food Insecurity: No Food Insecurity (1/6/2025)    [facility]    Hunger Vital Sign     Worried About Running Out of Food in the Last Year: Never true     Ran Out of Food in the Last Year: Never true   Recent Concern: Food Insecurity - Food Insecurity Present (11/11/2024)    Hunger Vital Sign     Worried About Running Out of Food in the Last Year: Sometimes true     Ran Out of Food in the Last Year: Sometimes true   Transportation Needs: No Transportation Needs (1/6/2025)    Received from [facility] and Its Subsidiaries and Affiliates    PRAPARE - Transportation     Lack of Transportation (Medical): No     Lack of Transportation (Non-Medical): No   Physical Activity: Unknown (11/11/2024)    Exercise Vital Sign     Days of Exercise per Week: 3 days   Stress: Stress Concern Present (11/11/2024)    [employer] [place] [employer] - Occupational  Stress Questionnaire     Feeling of Stress : To some extent   Housing Stability: Low Risk  (1/6/2025)    Received from [facility] and Its Subsidiaries and Affiliates    Housing Stability Vital Sign     Unable to Pay for Housing in the Last Year: No     Number of Times Moved in the Last Year: 1     Homeless in the Last Year: No   Recent Concern: Housing Stability - High Risk (11/11/2024)    Housing Stability Vital Sign     Unable to Pay for Housing in the Last Year: Yes

## 2025-07-30 ENCOUNTER — OFFICE VISIT (OUTPATIENT)
Dept: ORTHOPEDICS | Facility: CLINIC | Age: 60
End: 2025-07-30
Payer: COMMERCIAL

## 2025-07-30 VITALS — HEIGHT: 64 IN | WEIGHT: 110.88 LBS | BODY MASS INDEX: 18.93 KG/M2

## 2025-07-30 DIAGNOSIS — M18.0 ARTHRITIS OF CARPOMETACARPAL (CMC) JOINTS OF BOTH THUMBS: ICD-10-CM

## 2025-07-30 DIAGNOSIS — G56.03 BILATERAL CARPAL TUNNEL SYNDROME: Primary | ICD-10-CM

## 2025-07-30 PROCEDURE — 99999 PR PBB SHADOW E&M-EST. PATIENT-LVL IV: CPT | Mod: PBBFAC,,, | Performed by: ORTHOPAEDIC SURGERY

## 2025-07-30 RX ORDER — TRIAMCINOLONE ACETONIDE 40 MG/ML
40 INJECTION, SUSPENSION INTRA-ARTICULAR; INTRAMUSCULAR
Status: DISCONTINUED | OUTPATIENT
Start: 2025-07-30 | End: 2025-07-30 | Stop reason: HOSPADM

## 2025-07-30 RX ADMIN — TRIAMCINOLONE ACETONIDE 40 MG: 40 INJECTION, SUSPENSION INTRA-ARTICULAR; INTRAMUSCULAR at 10:07

## 2025-07-30 NOTE — PROGRESS NOTES
Subjective:     Patient ID: Roopa Hicks is a 60 y.o. female.    Chief Complaint: Pain of the Right Hand (9/10) and Pain of the Left Hand (9/10)      HPI:  The patient is a 60-year-old female with bilateral carpal tunnel syndrome and bilateral thumb basal joint arthritis last injected 04/15/2025 with good relief until recently and requests reinjection today.    Past Medical History:   Diagnosis Date    Anxiety     Arthritis     Carpal tunnel syndrome, bilateral     Depression     Diabetic peripheral neuropathy associated with type 2 diabetes mellitus     Essential (primary) hypertension     Gastroparesis due to secondary diabetes     Mild intermittent asthma, uncomplicated     Osteopenia     Renal impairment     Shoulder pain, right     Type 2 diabetes mellitus without complications      Past Surgical History:   Procedure Laterality Date    CARPAL TUNNEL RELEASE Left 2024    Procedure: RELEASE, CARPAL TUNNEL;  Surgeon: Renato Shoemaker MD;  Location: [facility];  Service: Orthopedics;  Laterality: Left;  left carpal tunnel release     SECTION      HYSTERECTOMY      INSERTION OF PACEMAKER Bilateral 2021    NEPHRECTOMY Left      Family History   Problem Relation Name Age of Onset    Diabetes Mother      Hypertension Mother      Heart disease Mother      Diabetes Father      Hypertension Father      Heart disease Father      Cancer Maternal Grandmother       Social History[1]  Medication List with Changes/Refills   Current Medications    ACCU-CHEK KACI PLUS TEST STRP STRP    USE TO TEST BLOOD SUGAR TID    ACCU-CHEK SOFTCLIX LANCETS MISC    TEST BLOOD SUGAR TID    ALBUTEROL (PROVENTIL) 2.5 MG /3 ML (0.083 %) NEBULIZER SOLUTION    2.5 mg 4 (four) times daily as needed for Wheezing.    ALBUTEROL (PROVENTIL/VENTOLIN HFA) 90 MCG/ACTUATION INHALER    Inhale 2 puffs into the lungs 4 (four) times daily as needed for Wheezing or Shortness of Breath.    ATORVASTATIN (LIPITOR) 40 MG TABLET    Take 40  mg by mouth once daily.    BLOOD-GLUCOSE SENSOR (FREESTYLE ASTER 3 SENSOR) RIANNA    CHANGE SENSOR EVERY 14     DAYS.    BUMETANIDE (BUMEX) 1 MG TABLET    Take 1 mg by mouth daily as needed.    CHOLECALCIFEROL, VITAMIN D3, 1,250 MCG (50,000 UNIT) CAPSULE    Take 1 capsule (50,000 Units total) by mouth once a week.    DICLOFENAC SODIUM (VOLTAREN) 1 % GEL    Apply topically 2 (two) times daily.    EMPAGLIFLOZIN (JARDIANCE) 10 MG TABLET    Take 1 tablet (10 mg total) by mouth once daily.    FUROSEMIDE (LASIX) 40 MG TABLET    1 daily as needed per guidelines    GABAPENTIN (NEURONTIN) 600 MG TABLET    Take 600 mg by mouth 3 (three) times daily as needed.    GLIPIZIDE (GLUCOTROL) 2.5 MG TR24    Take 2.5 mg by mouth daily with breakfast.    HYDROCODONE-ACETAMINOPHEN (NORCO) 5-325 MG PER TABLET    Take 1 tablet by mouth every 6 (six) hours as needed for Pain.    INSULIN LISPRO (HUMALOG KWIKPEN INSULIN) 100 UNIT/ML PEN    Inject 10 Units into the skin 3 (three) times daily.    IVABRADINE (CORLANOR) 5 MG TAB    Take 1 tablet by mouth 2 (two) times daily.    KETOCONAZOLE (NIZORAL) 2 % CREAM    Apply topically 2 (two) times daily.    KETOCONAZOLE (NIZORAL) 2 % CREAM    Use BID as needed for yeast rash    LANOLIN ALCOHOL-MINERAL OIL-WHITE PETROLATUM-CERES (MINERIN CREME) CREA CREAM    Apply topically as needed (to bilateral feet).    LANOLIN ALCOHOLS, BULK, MISC    Apply topically.    LINACLOTIDE (LINZESS) 72 MCG CAP CAPSULE    Take 1 capsule PO BID    METOPROLOL SUCCINATE (TOPROL-XL) 25 MG 24 HR TABLET    Take 1 tablet (25 mg total) by mouth every morning.    MIRTAZAPINE (REMERON) 30 MG TABLET    Take 1 tablet (30 mg total) by mouth every evening.    MUPIROCIN (BACTROBAN) 2 % OINTMENT    Apply topically 3 (three) times daily.    NEBULIZER AND COMPRESSOR RIANNA    Use every 4-6 hrs prn for wheezing    NEOMYCIN-POLYMYXIN-DEXAMETHASONE (MAXITROL) 3.5MG/ML-10,000 UNIT/ML-0.1 % DRPS    Place 1 drop into both eyes every 8 (eight) hours.  "   OLOPATADINE (PATANOL) 0.1 % OPHTHALMIC SOLUTION    Place 1 drop into both eyes 2 (two) times daily.    ONDANSETRON (ZOFRAN-ODT) 8 MG TBDL    Take 1 tablet (8 mg total) by mouth every 12 (twelve) hours as needed (nausea).    PEN NEEDLE, DIABETIC (BD ULTRA-FINE POLO PEN NEEDLE) 32 GAUGE X 5/32" NDLE    USE ONCE DAILY WITH BASAGLAR    PREGABALIN (LYRICA) 100 MG CAPSULE    Take 1 capsule (100 mg total) by mouth 2 (two) times daily.    SACUBITRIL-VALSARTAN (ENTRESTO) 24-26 MG PER TABLET    Take 1 tablet by mouth 2 (two) times daily.    SPIRONOLACTONE (ALDACTONE) 25 MG TABLET    Take 1 tablet (25 mg total) by mouth every morning.    TIRZEPATIDE (MOUNJARO) 10 MG/0.5 ML PNIJ    Inject 10 mg into the skin every 7 days.    TRAMADOL (ULTRAM) 50 MG TABLET    TK 1 T PO  BID    TRIAMCINOLONE ACETONIDE 0.1% (KENALOG) 0.1 % OINTMENT    Apply topically 2 (two) times daily.     Review of patient's allergies indicates:   Allergen Reactions    Grass pollen-june grass standard Hives     Review of Systems   Constitutional: Negative for malaise/fatigue.   HENT:  Negative for hearing loss.    Eyes:  Negative for double vision and visual disturbance.   Cardiovascular:  Negative for chest pain.   Respiratory:  Positive for wheezing. Negative for shortness of breath.    Endocrine: Negative for cold intolerance.   Hematologic/Lymphatic: Does not bruise/bleed easily.   Skin:  Negative for poor wound healing and suspicious lesions.   Musculoskeletal:  Positive for arthritis, joint pain and joint swelling. Negative for gout.   Gastrointestinal:  Positive for constipation and diarrhea. Negative for nausea and vomiting.   Genitourinary:  Negative for dysuria.   Neurological:  Positive for focal weakness, numbness, paresthesias and sensory change.   Psychiatric/Behavioral:  Positive for depression. Negative for memory loss and substance abuse. The patient is nervous/anxious.    Allergic/Immunologic: Negative for persistent infections. "       Objective:   Body mass index is 19.03 kg/m².  There were no vitals filed for this visit.             General    Constitutional: She is oriented to person, place, and time. She appears well-developed and well-nourished. No distress.   HENT:   Head: Normocephalic.   Eyes: EOM are normal.   Pulmonary/Chest: Effort normal.   Neurological: She is oriented to person, place, and time.   Psychiatric: She has a normal mood and affect.             Right Hand/Wrist Exam     Inspection   Scars: Wrist - absent Hand -  absent  Effusion: Wrist - absent Hand -  absent    Pain   Wrist - The patient exhibits pain of the flexor/pronator group and CMC.    Tests   Phalens sign: positive  Tinel's sign (median nerve): positive  Carpal Tunnel Compression Test: positive    Atrophy   Thenar:  negative  Intrinsic:  negative    Other     Neuorologic Exam    Median Distribution: abnormal  Ulnar Distribution: normal  Radial Distribution: normal    Comments:  The patient has a positive Tinel and positive Phalen sign.  There is no thenar atrophy noted.  The patient has tenderness about the right thumb basal joint with a positive axial circumduction grind test      Left Hand/Wrist Exam     Inspection   Scars: Wrist - present Hand -  present  Effusion: Wrist - absent Hand -  absent    Pain   Wrist - The patient exhibits pain of the flexor/pronator group and CMC.    Tests   Phalens sign: positive  Tinel's sign (median nerve): positive  Carpal Tunnel Compression Test: positive    Atrophy  Thenar:  Negative  Intrinsic: negative    Other     Sensory Exam  Median Distribution: abnormal  Ulnar Distribution: normal  Radial Distribution: normal    Comments:  The patient has a positive Tinel and positive Phalen sign.  There is no thenar atrophy noted.  She has a well-healed left carpal tunnel scar.  There is tenderness about the basal joint left thumb with a positive axial circumduction grind test          Vascular Exam       Capillary Refill  Right  Hand: normal capillary refill  Left Hand: normal capillary refill       radiographs were not obtained today  Assessment:     Encounter Diagnoses   Name Primary?    Bilateral carpal tunnel syndrome Yes    Arthritis of carpometacarpal (CMC) joints of both thumbs         Plan:     The patient is injected both carpal tunnels each with 1 cc Kenalog and 1 cc 2% plain lidocaine under sterile technique.  She was injected in both thumb basal joints each with 0.5 cc Kenalog and 0.5 cc 2% plain lidocaine under sterile technique.  She will wait at least 3 months between injections.                Disclaimer: This note was prepared using a voice recognition system and is likely to have sound alike errors within the text.          [1]   Social History  Socioeconomic History    Marital status:    Tobacco Use    Smoking status: Never     Passive exposure: Never    Smokeless tobacco: Never   Substance and Sexual Activity    Alcohol use: Never    Drug use: Never    Sexual activity: Not Currently     Social Drivers of Health     Financial Resource Strain: Medium Risk (11/11/2024)    Overall Financial Resource Strain (CARDIA)     Difficulty of Paying Living Expenses: Somewhat hard   Food Insecurity: No Food Insecurity (1/6/2025)    Received from [facility] and Its Subsidiaries and Affiliates    Hunger Vital Sign     Worried About Running Out of Food in the Last Year: Never true     Ran Out of Food in the Last Year: Never true   Recent Concern: Food Insecurity - Food Insecurity Present (11/11/2024)    Hunger Vital Sign     Worried About Running Out of Food in the Last Year: Sometimes true     Ran Out of Food in the Last Year: Sometimes true   Transportation Needs: No Transportation Needs (1/6/2025)    Received from [facility] and Its Subsidiaries and Affiliates    PRALEOLAE - Transportation     Lack of Transportation (Medical): No     Lack of  Transportation (Non-Medical): No   Physical Activity: Unknown (11/11/2024)    Exercise Vital Sign     Days of Exercise per Week: 3 days   Stress: Stress Concern Present (11/11/2024)    [employer] [place] [employer] - Occupational Stress Questionnaire     Feeling of Stress : To some extent   Housing Stability: Low Risk  (1/6/2025)    Received from [facility] and Its Subsidiaries and Affiliates    Housing Stability Vital Sign     Unable to Pay for Housing in the Last Year: No     Number of Times Moved in the Last Year: 1     Homeless in the Last Year: No   Recent Concern: Housing Stability - High Risk (11/11/2024)    Housing Stability Vital Sign     Unable to Pay for Housing in the Last Year: Yes

## 2025-07-30 NOTE — PROCEDURES
Small Joint Aspiration/Injection: R thumb CMC, L thumb CMC    Date/Time: 7/30/2025 10:45 AM    Performed by: Renato Shoemaker MD  Authorized by: Renato Shoemaker MD    Site marked: the procedure site was marked    Timeout: prior to procedure the correct patient, procedure, and site was verified    Prep: patient was prepped and draped in usual sterile fashion      Local anesthesia used?: Yes    Local anesthetic:  Lidocaine 2% without epinephrine  Anesthetic total (ml):  1    Location:  Thumb  Site:  R thumb CMC and L thumb CMC  Ultrasonic guidance for needle placement?: No    Needle size:  25 G  Approach:  Dorsal  Medications:  40 mg triamcinolone acetonide 40 mg/mL; 40 mg triamcinolone acetonide 40 mg/mL

## 2025-07-30 NOTE — PROCEDURES
Carpal Tunnel: R carpal tunnel, L carpal tunnel    Date/Time: 7/30/2025 10:45 AM    Performed by: Renato Shoemaker MD  Authorized by: Renato Shoemaker MD    Consent Done?:  Yes (Verbal)  Indications:  Pain  Timeout: prior to procedure the correct patient, procedure, and site was verified    Prep: patient was prepped and draped in usual sterile fashion      Local anesthesia used?: Yes    Local anesthetic:  Lidocaine 2% without epinephrine  Anesthetic total (ml):  2    Location:  Wrist  Site:  R carpal tunnel and L carpal tunnel  Ultrasonic Guidance for Needle Placement?: No    Needle size:  25 G  Approach:  Volar  Medications:  40 mg triamcinolone acetonide 40 mg/mL; 40 mg triamcinolone acetonide 40 mg/mL

## 2025-08-13 ENCOUNTER — PATIENT MESSAGE (OUTPATIENT)
Dept: ADMINISTRATIVE | Facility: HOSPITAL | Age: 60
End: 2025-08-13
Payer: COMMERCIAL

## 2025-08-20 ENCOUNTER — OFFICE VISIT (OUTPATIENT)
Dept: PRIMARY CARE CLINIC | Facility: CLINIC | Age: 60
End: 2025-08-20
Payer: COMMERCIAL

## 2025-08-20 VITALS
OXYGEN SATURATION: 99 % | TEMPERATURE: 97 F | HEIGHT: 64 IN | BODY MASS INDEX: 19.47 KG/M2 | HEART RATE: 86 BPM | WEIGHT: 114.06 LBS | DIASTOLIC BLOOD PRESSURE: 72 MMHG | SYSTOLIC BLOOD PRESSURE: 128 MMHG

## 2025-08-20 DIAGNOSIS — M79.641 BILATERAL HAND PAIN: Primary | ICD-10-CM

## 2025-08-20 DIAGNOSIS — M79.642 BILATERAL HAND PAIN: Primary | ICD-10-CM

## 2025-08-20 DIAGNOSIS — M79.672 PAIN IN LEFT FOOT: ICD-10-CM

## 2025-08-20 DIAGNOSIS — G56.03 CARPAL TUNNEL SYNDROME, BILATERAL: ICD-10-CM

## 2025-08-20 DIAGNOSIS — M53.3 SACROCOCCYGEAL PAIN: ICD-10-CM

## 2025-08-20 DIAGNOSIS — M79.671 PAIN IN RIGHT FOOT: ICD-10-CM

## 2025-08-20 DIAGNOSIS — E11.42 DIABETIC PERIPHERAL NEUROPATHY ASSOCIATED WITH TYPE 2 DIABETES MELLITUS: ICD-10-CM

## 2025-08-20 PROCEDURE — 3074F SYST BP LT 130 MM HG: CPT | Mod: CPTII,S$GLB,, | Performed by: INTERNAL MEDICINE

## 2025-08-20 PROCEDURE — 1159F MED LIST DOCD IN RCRD: CPT | Mod: CPTII,S$GLB,, | Performed by: INTERNAL MEDICINE

## 2025-08-20 PROCEDURE — 4010F ACE/ARB THERAPY RXD/TAKEN: CPT | Mod: CPTII,S$GLB,, | Performed by: INTERNAL MEDICINE

## 2025-08-20 PROCEDURE — 99214 OFFICE O/P EST MOD 30 MIN: CPT | Mod: S$GLB,,, | Performed by: INTERNAL MEDICINE

## 2025-08-20 PROCEDURE — 3008F BODY MASS INDEX DOCD: CPT | Mod: CPTII,S$GLB,, | Performed by: INTERNAL MEDICINE

## 2025-08-20 PROCEDURE — 1160F RVW MEDS BY RX/DR IN RCRD: CPT | Mod: CPTII,S$GLB,, | Performed by: INTERNAL MEDICINE

## 2025-08-20 PROCEDURE — 3078F DIAST BP <80 MM HG: CPT | Mod: CPTII,S$GLB,, | Performed by: INTERNAL MEDICINE

## 2025-08-20 PROCEDURE — 3044F HG A1C LEVEL LT 7.0%: CPT | Mod: CPTII,S$GLB,, | Performed by: INTERNAL MEDICINE

## 2025-08-20 PROCEDURE — 99999 PR PBB SHADOW E&M-EST. PATIENT-LVL IV: CPT | Mod: PBBFAC,,, | Performed by: INTERNAL MEDICINE

## 2025-08-20 RX ORDER — PREGABALIN 100 MG/1
100 CAPSULE ORAL 2 TIMES DAILY
Qty: 180 CAPSULE | Refills: 1 | Status: SHIPPED | OUTPATIENT
Start: 2025-08-20 | End: 2025-11-18

## 2025-08-29 ENCOUNTER — TELEPHONE (OUTPATIENT)
Dept: PRIMARY CARE CLINIC | Facility: CLINIC | Age: 60
End: 2025-08-29
Payer: COMMERCIAL

## 2025-09-02 DIAGNOSIS — Z76.89 ESTABLISHING CARE WITH NEW DOCTOR, ENCOUNTER FOR: ICD-10-CM

## 2025-09-02 RX ORDER — ONDANSETRON 8 MG/1
8 TABLET, ORALLY DISINTEGRATING ORAL EVERY 12 HOURS PRN
Qty: 30 TABLET | Refills: 11 | Status: SHIPPED | OUTPATIENT
Start: 2025-09-02

## (undated) DEVICE — TOWEL OR DISP STRL BLUE 4/PK

## (undated) DEVICE — BANDAGE ELASTIC 3X5 VELCRO ST

## (undated) DEVICE — PAD ABDOMINAL STERILE 8X10IN

## (undated) DEVICE — DRAPE THREE-QTR REINF 53X77IN

## (undated) DEVICE — SCRUB DYNA-HEX LIQ 4% CHG 4OZ

## (undated) DEVICE — SOL NACL IRR 1000ML BTL

## (undated) DEVICE — GOWN NONREINF SET-IN SLV 2XL

## (undated) DEVICE — DRESSING XEROFORM NONADH 1X8IN

## (undated) DEVICE — BANDAGE ESMARK ELASTIC ST 4X9

## (undated) DEVICE — PAD CAST SPECIALIST STRL 3

## (undated) DEVICE — ALCOHOL 70% ANTISEPTIC ISO 4OZ

## (undated) DEVICE — DRAPE HAND STERILE

## (undated) DEVICE — PACK BASIC SETUP SC BR

## (undated) DEVICE — SYR LUER LOCK STERILE 10ML

## (undated) DEVICE — SUT 4-0 ETHILON 18 PS-2

## (undated) DEVICE — APPLICATOR CHLORAPREP ORN 26ML

## (undated) DEVICE — UNDERGLOVES BIOGEL PI SIZE 7.5

## (undated) DEVICE — NDL ECLIPSE SAF REG 25GX1.5IN

## (undated) DEVICE — GLOVE BIOGEL SZ 8 1/2

## (undated) DEVICE — HEADREST ROUND DISP FOAM 9IN

## (undated) DEVICE — DRAPE U SPLIT SHEET 54X76IN

## (undated) DEVICE — SPONGE COTTON TRAY 4X4IN

## (undated) DEVICE — TOURNIQUET SB QC DP 18X4IN

## (undated) DEVICE — COVER LIGHT HANDLE 80/CA

## (undated) DEVICE — GOWN POLY REINF BRTH SLV XL